# Patient Record
Sex: FEMALE | NOT HISPANIC OR LATINO | URBAN - METROPOLITAN AREA
[De-identification: names, ages, dates, MRNs, and addresses within clinical notes are randomized per-mention and may not be internally consistent; named-entity substitution may affect disease eponyms.]

---

## 2017-02-03 ENCOUNTER — OUTPATIENT (OUTPATIENT)
Dept: OUTPATIENT SERVICES | Facility: HOSPITAL | Age: 57
LOS: 1 days | End: 2017-02-03
Payer: COMMERCIAL

## 2017-02-03 ENCOUNTER — APPOINTMENT (OUTPATIENT)
Dept: ULTRASOUND IMAGING | Facility: IMAGING CENTER | Age: 57
End: 2017-02-03

## 2017-02-03 DIAGNOSIS — Z00.8 ENCOUNTER FOR OTHER GENERAL EXAMINATION: ICD-10-CM

## 2017-02-03 PROCEDURE — 76856 US EXAM PELVIC COMPLETE: CPT

## 2017-02-03 PROCEDURE — 76830 TRANSVAGINAL US NON-OB: CPT

## 2017-02-08 ENCOUNTER — EMERGENCY (EMERGENCY)
Facility: HOSPITAL | Age: 57
LOS: 1 days | Discharge: HOME | End: 2017-02-08
Attending: EMERGENCY MEDICINE | Admitting: EMERGENCY MEDICINE
Payer: COMMERCIAL

## 2017-02-08 VITALS
HEART RATE: 66 BPM | RESPIRATION RATE: 17 BRPM | OXYGEN SATURATION: 97 % | SYSTOLIC BLOOD PRESSURE: 141 MMHG | DIASTOLIC BLOOD PRESSURE: 93 MMHG

## 2017-02-08 VITALS
OXYGEN SATURATION: 96 % | RESPIRATION RATE: 16 BRPM | DIASTOLIC BLOOD PRESSURE: 117 MMHG | TEMPERATURE: 98 F | SYSTOLIC BLOOD PRESSURE: 183 MMHG | HEART RATE: 75 BPM

## 2017-02-08 DIAGNOSIS — T41.3X5A ADVERSE EFFECT OF LOCAL ANESTHETICS, INITIAL ENCOUNTER: ICD-10-CM

## 2017-02-08 LAB
ALBUMIN SERPL ELPH-MCNC: 4 G/DL — SIGNIFICANT CHANGE UP (ref 3.3–5)
ALP SERPL-CCNC: 91 U/L — SIGNIFICANT CHANGE UP (ref 40–120)
ALT FLD-CCNC: 19 U/L RC — SIGNIFICANT CHANGE UP (ref 10–45)
ANION GAP SERPL CALC-SCNC: 13 MMOL/L — SIGNIFICANT CHANGE UP (ref 5–17)
AST SERPL-CCNC: 22 U/L — SIGNIFICANT CHANGE UP (ref 10–40)
BASOPHILS # BLD AUTO: 0.1 K/UL — SIGNIFICANT CHANGE UP (ref 0–0.2)
BASOPHILS NFR BLD AUTO: 0.7 % — SIGNIFICANT CHANGE UP (ref 0–2)
BILIRUB SERPL-MCNC: 0.2 MG/DL — SIGNIFICANT CHANGE UP (ref 0.2–1.2)
BUN SERPL-MCNC: 15 MG/DL — SIGNIFICANT CHANGE UP (ref 7–23)
CALCIUM SERPL-MCNC: 9.2 MG/DL — SIGNIFICANT CHANGE UP (ref 8.4–10.5)
CHLORIDE SERPL-SCNC: 104 MMOL/L — SIGNIFICANT CHANGE UP (ref 96–108)
CK MB CFR SERPL CALC: 1.4 NG/ML — SIGNIFICANT CHANGE UP (ref 0–3.8)
CK SERPL-CCNC: 85 U/L — SIGNIFICANT CHANGE UP (ref 25–170)
CO2 SERPL-SCNC: 26 MMOL/L — SIGNIFICANT CHANGE UP (ref 22–31)
CREAT SERPL-MCNC: 0.65 MG/DL — SIGNIFICANT CHANGE UP (ref 0.5–1.3)
EOSINOPHIL # BLD AUTO: 0.4 K/UL — SIGNIFICANT CHANGE UP (ref 0–0.5)
EOSINOPHIL NFR BLD AUTO: 4.8 % — SIGNIFICANT CHANGE UP (ref 0–6)
GLUCOSE SERPL-MCNC: 103 MG/DL — HIGH (ref 70–99)
HCT VFR BLD CALC: 37 % — SIGNIFICANT CHANGE UP (ref 34.5–45)
HGB BLD-MCNC: 12.6 G/DL — SIGNIFICANT CHANGE UP (ref 11.5–15.5)
LYMPHOCYTES # BLD AUTO: 2.8 K/UL — SIGNIFICANT CHANGE UP (ref 1–3.3)
LYMPHOCYTES # BLD AUTO: 36.2 % — SIGNIFICANT CHANGE UP (ref 13–44)
MCHC RBC-ENTMCNC: 31.3 PG — SIGNIFICANT CHANGE UP (ref 27–34)
MCHC RBC-ENTMCNC: 34 GM/DL — SIGNIFICANT CHANGE UP (ref 32–36)
MCV RBC AUTO: 92.2 FL — SIGNIFICANT CHANGE UP (ref 80–100)
MONOCYTES # BLD AUTO: 0.6 K/UL — SIGNIFICANT CHANGE UP (ref 0–0.9)
MONOCYTES NFR BLD AUTO: 8.3 % — SIGNIFICANT CHANGE UP (ref 2–14)
NEUTROPHILS # BLD AUTO: 3.9 K/UL — SIGNIFICANT CHANGE UP (ref 1.8–7.4)
NEUTROPHILS NFR BLD AUTO: 50 % — SIGNIFICANT CHANGE UP (ref 43–77)
PLATELET # BLD AUTO: 150 K/UL — SIGNIFICANT CHANGE UP (ref 150–400)
POTASSIUM SERPL-MCNC: 4.2 MMOL/L — SIGNIFICANT CHANGE UP (ref 3.5–5.3)
POTASSIUM SERPL-SCNC: 4.2 MMOL/L — SIGNIFICANT CHANGE UP (ref 3.5–5.3)
PROT SERPL-MCNC: 7.4 G/DL — SIGNIFICANT CHANGE UP (ref 6–8.3)
RBC # BLD: 4.01 M/UL — SIGNIFICANT CHANGE UP (ref 3.8–5.2)
RBC # FLD: 11.2 % — SIGNIFICANT CHANGE UP (ref 10.3–14.5)
SODIUM SERPL-SCNC: 143 MMOL/L — SIGNIFICANT CHANGE UP (ref 135–145)
TROPONIN T SERPL-MCNC: <0.01 NG/ML — SIGNIFICANT CHANGE UP (ref 0–0.06)
WBC # BLD: 7.7 K/UL — SIGNIFICANT CHANGE UP (ref 3.8–10.5)
WBC # FLD AUTO: 7.7 K/UL — SIGNIFICANT CHANGE UP (ref 3.8–10.5)

## 2017-02-08 PROCEDURE — 84484 ASSAY OF TROPONIN QUANT: CPT

## 2017-02-08 PROCEDURE — 93010 ELECTROCARDIOGRAM REPORT: CPT | Mod: NC

## 2017-02-08 PROCEDURE — 71046 X-RAY EXAM CHEST 2 VIEWS: CPT

## 2017-02-08 PROCEDURE — 82553 CREATINE MB FRACTION: CPT

## 2017-02-08 PROCEDURE — 99284 EMERGENCY DEPT VISIT MOD MDM: CPT | Mod: 25

## 2017-02-08 PROCEDURE — 80053 COMPREHEN METABOLIC PANEL: CPT

## 2017-02-08 PROCEDURE — 85027 COMPLETE CBC AUTOMATED: CPT

## 2017-02-08 PROCEDURE — 71020: CPT | Mod: 26

## 2017-02-08 PROCEDURE — 99285 EMERGENCY DEPT VISIT HI MDM: CPT | Mod: 25

## 2017-02-08 PROCEDURE — 93005 ELECTROCARDIOGRAM TRACING: CPT

## 2017-02-08 PROCEDURE — 82550 ASSAY OF CK (CPK): CPT

## 2017-02-08 NOTE — ED PROVIDER NOTE - MEDICAL DECISION MAKING DETAILS
Patient with palpitations and non-focal weakness after receiving anesthetic injection during root canal.  Likely contained epinephrine which caused patient's symptoms.  Cardiac risk factors are HTN and overweight. Given age will check EKG and single trop (first episode 6 hours ago) monitor while in ER.  Will need outpatient follow up with cardiologist for provocative testing.

## 2017-02-08 NOTE — ED PROVIDER NOTE - OBJECTIVE STATEMENT
56F PMH p/w dizziness.  4pm sudden onset, felt weak, sweaty, anxious, and had palpitations. Has had palpitations in the past, had stress test 10 years ago was normal. Was at the dentist having root canal and occurred right after she got novacaine injection.  Told them about symptoms and they continued root canal, BP was 180/110.  Episode lasted 40 minutes, completely resolved.  Did not take anything for symptoms.  Continued to have episodes of palpitations and weakness for the next few hours, decided to come in.  No CP, had some cough with the palpitations, no SOB, no nausea/vomiting, no angioedema, no rash.  Did not take cozaar today.

## 2017-02-08 NOTE — ED PROVIDER NOTE - CARE PLAN
Principal Discharge DX:	Dizziness  Instructions for follow-up, activity and diet:	Follow up with your primary doctor in 2-3 days  Follow up with your Cardiologist in 1 week to consult about possible stress test  Take all usual medications as prescribed  Return to the emergency department for chest pain, worsening dizziness, inability to walk, nausea/vomiting, or if you have any new or changing symptoms or concerns Principal Discharge DX:	Medication reaction  Instructions for follow-up, activity and diet:	Follow up with your primary doctor in 2-3 days  Follow up with your Cardiologist in 1 week to consult about possible stress test  Take all usual medications as prescribed  Return to the emergency department for chest pain, worsening dizziness, inability to walk, nausea/vomiting, or if you have any new or changing symptoms or concerns

## 2017-02-08 NOTE — ED PROVIDER NOTE - NEUROLOGICAL, MLM
Alert and oriented, speech normal, CN II-XII intact, no focal deficits, no motor or sensory deficits, no pronator drift, finger to nose testing normal, no dysdiadochokinesia, gait normal.

## 2017-02-08 NOTE — ED ADULT NURSE NOTE - OBJECTIVE STATEMENT
Presents c/o dizziness today while getting a root canal on lt lower. Pt states that when the procedure was completed the dentist took her blood pressure and it was elevated. Pt went home and family noted a lt facial droop. All symptoms resolved on arrival to ED. Pt A&ox3. PERRL. ROSE. Ambulates. Denies CP/SOB at present. No numbness/tingling. Respirations even/unlabored. Abd soft. No n/v/d. Skin WDI.

## 2017-02-08 NOTE — ED PROVIDER NOTE - PLAN OF CARE
Follow up with your primary doctor in 2-3 days  Follow up with your Cardiologist in 1 week to consult about possible stress test  Take all usual medications as prescribed  Return to the emergency department for chest pain, worsening dizziness, inability to walk, nausea/vomiting, or if you have any new or changing symptoms or concerns

## 2017-02-08 NOTE — ED ADULT TRIAGE NOTE - CHIEF COMPLAINT QUOTE
during root canal today patient felt light headed and dizzy   at home patient's family noted left sided facial droop at 1800 which has resolved  no facial droop now, no drift or increased weakness to one side noted in triage.

## 2017-07-14 ENCOUNTER — EMERGENCY (EMERGENCY)
Facility: HOSPITAL | Age: 57
LOS: 1 days | Discharge: HOME | End: 2017-07-14
Attending: EMERGENCY MEDICINE
Payer: COMMERCIAL

## 2017-07-14 VITALS
SYSTOLIC BLOOD PRESSURE: 121 MMHG | TEMPERATURE: 98 F | HEART RATE: 88 BPM | RESPIRATION RATE: 18 BRPM | DIASTOLIC BLOOD PRESSURE: 84 MMHG | OXYGEN SATURATION: 98 %

## 2017-07-14 PROCEDURE — 99285 EMERGENCY DEPT VISIT HI MDM: CPT | Mod: 25

## 2017-07-14 NOTE — ED ADULT NURSE NOTE - OBJECTIVE STATEMENT
56 y.o. female presents to ED c/o rectal pain. Patient states she has history of colitis and has seen PMD last week secondary to diarrhea, rx home with steroids and cream, no antibiotics. Patient states she has had bloody diarrhea x 1 month, now worsening and increasing in frequency with some LLQ abd pain. scheduled for colonoscopy. Denies HA, CP, SOB, nausea, vomiting, recent travel, hematuria, dysuria. Patient undressed and placed into gown, call bell in hand and side rails up with bed in lowest position for safety. blanket provided, vital signs stable, patient in no acute distress. Comfort and safety provided.

## 2017-07-15 VITALS
TEMPERATURE: 98 F | RESPIRATION RATE: 18 BRPM | HEART RATE: 82 BPM | SYSTOLIC BLOOD PRESSURE: 121 MMHG | DIASTOLIC BLOOD PRESSURE: 75 MMHG | OXYGEN SATURATION: 98 %

## 2017-07-15 LAB
ALBUMIN SERPL ELPH-MCNC: 3.3 G/DL — SIGNIFICANT CHANGE UP (ref 3.3–5)
ALP SERPL-CCNC: 59 U/L — SIGNIFICANT CHANGE UP (ref 40–120)
ALT FLD-CCNC: 15 U/L RC — SIGNIFICANT CHANGE UP (ref 10–45)
ANION GAP SERPL CALC-SCNC: 13 MMOL/L — SIGNIFICANT CHANGE UP (ref 5–17)
APTT BLD: 33.8 SEC — SIGNIFICANT CHANGE UP (ref 27.5–37.4)
AST SERPL-CCNC: 22 U/L — SIGNIFICANT CHANGE UP (ref 10–40)
BASOPHILS # BLD AUTO: 0 K/UL — SIGNIFICANT CHANGE UP (ref 0–0.2)
BASOPHILS NFR BLD AUTO: 0.1 % — SIGNIFICANT CHANGE UP (ref 0–2)
BILIRUB SERPL-MCNC: 0.6 MG/DL — SIGNIFICANT CHANGE UP (ref 0.2–1.2)
BUN SERPL-MCNC: 9 MG/DL — SIGNIFICANT CHANGE UP (ref 7–23)
CALCIUM SERPL-MCNC: 8.8 MG/DL — SIGNIFICANT CHANGE UP (ref 8.4–10.5)
CHLORIDE SERPL-SCNC: 100 MMOL/L — SIGNIFICANT CHANGE UP (ref 96–108)
CO2 SERPL-SCNC: 26 MMOL/L — SIGNIFICANT CHANGE UP (ref 22–31)
CREAT SERPL-MCNC: 0.69 MG/DL — SIGNIFICANT CHANGE UP (ref 0.5–1.3)
EOSINOPHIL # BLD AUTO: 0 K/UL — SIGNIFICANT CHANGE UP (ref 0–0.5)
EOSINOPHIL NFR BLD AUTO: 0.3 % — SIGNIFICANT CHANGE UP (ref 0–6)
GAS PNL BLDV: SIGNIFICANT CHANGE UP
GLUCOSE SERPL-MCNC: 140 MG/DL — HIGH (ref 70–99)
HCT VFR BLD CALC: 39.6 % — SIGNIFICANT CHANGE UP (ref 34.5–45)
HGB BLD-MCNC: 12.8 G/DL — SIGNIFICANT CHANGE UP (ref 11.5–15.5)
INR BLD: 1.27 RATIO — HIGH (ref 0.88–1.16)
LYMPHOCYTES # BLD AUTO: 1.4 K/UL — SIGNIFICANT CHANGE UP (ref 1–3.3)
LYMPHOCYTES # BLD AUTO: 16.3 % — SIGNIFICANT CHANGE UP (ref 13–44)
MCHC RBC-ENTMCNC: 29.7 PG — SIGNIFICANT CHANGE UP (ref 27–34)
MCHC RBC-ENTMCNC: 32.3 GM/DL — SIGNIFICANT CHANGE UP (ref 32–36)
MCV RBC AUTO: 92 FL — SIGNIFICANT CHANGE UP (ref 80–100)
MONOCYTES # BLD AUTO: 1.2 K/UL — HIGH (ref 0–0.9)
MONOCYTES NFR BLD AUTO: 14.5 % — HIGH (ref 2–14)
NEUTROPHILS # BLD AUTO: 5.7 K/UL — SIGNIFICANT CHANGE UP (ref 1.8–7.4)
NEUTROPHILS NFR BLD AUTO: 68.9 % — SIGNIFICANT CHANGE UP (ref 43–77)
PLATELET # BLD AUTO: 194 K/UL — SIGNIFICANT CHANGE UP (ref 150–400)
POTASSIUM SERPL-MCNC: 4 MMOL/L — SIGNIFICANT CHANGE UP (ref 3.5–5.3)
POTASSIUM SERPL-SCNC: 4 MMOL/L — SIGNIFICANT CHANGE UP (ref 3.5–5.3)
PROT SERPL-MCNC: 6.9 G/DL — SIGNIFICANT CHANGE UP (ref 6–8.3)
PROTHROM AB SERPL-ACNC: 13.9 SEC — HIGH (ref 9.8–12.7)
RBC # BLD: 4.31 M/UL — SIGNIFICANT CHANGE UP (ref 3.8–5.2)
RBC # FLD: 11.6 % — SIGNIFICANT CHANGE UP (ref 10.3–14.5)
SODIUM SERPL-SCNC: 139 MMOL/L — SIGNIFICANT CHANGE UP (ref 135–145)
WBC # BLD: 8.3 K/UL — SIGNIFICANT CHANGE UP (ref 3.8–10.5)
WBC # FLD AUTO: 8.3 K/UL — SIGNIFICANT CHANGE UP (ref 3.8–10.5)

## 2017-07-15 PROCEDURE — 85027 COMPLETE CBC AUTOMATED: CPT

## 2017-07-15 PROCEDURE — 96374 THER/PROPH/DIAG INJ IV PUSH: CPT

## 2017-07-15 PROCEDURE — 85730 THROMBOPLASTIN TIME PARTIAL: CPT

## 2017-07-15 PROCEDURE — 82435 ASSAY OF BLOOD CHLORIDE: CPT

## 2017-07-15 PROCEDURE — 87449 NOS EACH ORGANISM AG IA: CPT

## 2017-07-15 PROCEDURE — 82330 ASSAY OF CALCIUM: CPT

## 2017-07-15 PROCEDURE — 74177 CT ABD & PELVIS W/CONTRAST: CPT | Mod: 26

## 2017-07-15 PROCEDURE — 84132 ASSAY OF SERUM POTASSIUM: CPT

## 2017-07-15 PROCEDURE — 87046 STOOL CULTR AEROBIC BACT EA: CPT

## 2017-07-15 PROCEDURE — 82803 BLOOD GASES ANY COMBINATION: CPT

## 2017-07-15 PROCEDURE — 87045 FECES CULTURE AEROBIC BACT: CPT

## 2017-07-15 PROCEDURE — 99284 EMERGENCY DEPT VISIT MOD MDM: CPT | Mod: 25

## 2017-07-15 PROCEDURE — 87324 CLOSTRIDIUM AG IA: CPT

## 2017-07-15 PROCEDURE — 85014 HEMATOCRIT: CPT

## 2017-07-15 PROCEDURE — 84295 ASSAY OF SERUM SODIUM: CPT

## 2017-07-15 PROCEDURE — 96375 TX/PRO/DX INJ NEW DRUG ADDON: CPT

## 2017-07-15 PROCEDURE — 83690 ASSAY OF LIPASE: CPT

## 2017-07-15 PROCEDURE — 87177 OVA AND PARASITES SMEARS: CPT

## 2017-07-15 PROCEDURE — 80053 COMPREHEN METABOLIC PANEL: CPT

## 2017-07-15 PROCEDURE — 82947 ASSAY GLUCOSE BLOOD QUANT: CPT

## 2017-07-15 PROCEDURE — 83605 ASSAY OF LACTIC ACID: CPT

## 2017-07-15 PROCEDURE — 74177 CT ABD & PELVIS W/CONTRAST: CPT

## 2017-07-15 PROCEDURE — 85610 PROTHROMBIN TIME: CPT

## 2017-07-15 RX ORDER — METRONIDAZOLE 500 MG
500 TABLET ORAL ONCE
Qty: 0 | Refills: 0 | Status: COMPLETED | OUTPATIENT
Start: 2017-07-15 | End: 2017-07-15

## 2017-07-15 RX ORDER — OXYCODONE HYDROCHLORIDE 5 MG/1
1 TABLET ORAL
Qty: 10 | Refills: 0 | OUTPATIENT
Start: 2017-07-15 | End: 2017-07-18

## 2017-07-15 RX ORDER — MORPHINE SULFATE 50 MG/1
2 CAPSULE, EXTENDED RELEASE ORAL ONCE
Qty: 0 | Refills: 0 | Status: DISCONTINUED | OUTPATIENT
Start: 2017-07-15 | End: 2017-07-15

## 2017-07-15 RX ORDER — METRONIDAZOLE 500 MG
1 TABLET ORAL
Qty: 30 | Refills: 0 | OUTPATIENT
Start: 2017-07-15 | End: 2017-07-25

## 2017-07-15 RX ORDER — CIPROFLOXACIN LACTATE 400MG/40ML
400 VIAL (ML) INTRAVENOUS ONCE
Qty: 0 | Refills: 0 | Status: COMPLETED | OUTPATIENT
Start: 2017-07-15 | End: 2017-07-15

## 2017-07-15 RX ORDER — OXYCODONE HYDROCHLORIDE 5 MG/1
5 TABLET ORAL ONCE
Qty: 0 | Refills: 0 | Status: DISCONTINUED | OUTPATIENT
Start: 2017-07-15 | End: 2017-07-15

## 2017-07-15 RX ORDER — CIPROFLOXACIN LACTATE 400MG/40ML
1 VIAL (ML) INTRAVENOUS
Qty: 20 | Refills: 0 | OUTPATIENT
Start: 2017-07-15 | End: 2017-07-25

## 2017-07-15 RX ADMIN — Medication 200 MILLIGRAM(S): at 05:55

## 2017-07-15 RX ADMIN — OXYCODONE HYDROCHLORIDE 5 MILLIGRAM(S): 5 TABLET ORAL at 07:52

## 2017-07-15 RX ADMIN — OXYCODONE HYDROCHLORIDE 5 MILLIGRAM(S): 5 TABLET ORAL at 06:54

## 2017-07-15 RX ADMIN — Medication 100 MILLIGRAM(S): at 07:51

## 2017-07-15 RX ADMIN — Medication 40 MILLIGRAM(S): at 06:54

## 2017-07-15 RX ADMIN — MORPHINE SULFATE 2 MILLIGRAM(S): 50 CAPSULE, EXTENDED RELEASE ORAL at 02:46

## 2017-07-15 NOTE — ED PROVIDER NOTE - CARE PLAN
Principal Discharge DX:	Abdominal pain  Instructions for follow-up, activity and diet:	You were seen in the Emergency Department for abdominal pain. Take the steroids and antibiotics as written. Please follow up with your gastroenterologist as soon as possible for further evaluation. Please return to the Emergency Department if you have any new concerning symptoms such as severe pain, weakness, shortness of breath or any other concerning symptoms.

## 2017-07-15 NOTE — ED ADULT NURSE REASSESSMENT NOTE - NS ED NURSE REASSESS COMMENT FT1
Received awake alert and orientedx3 Resp even and nonlab Denies sob denies chest pain Flagyl given as ordered stool specimen to lab

## 2017-07-15 NOTE — ED PROVIDER NOTE - OBJECTIVE STATEMENT
57yo female history of ulcerative colitis p/w diarrhea with blood for 1 month. Pt. reporting increasing frequency of stools. Pt. saw GI monday, started on topical anesthetic. Not on antibiotics. Pt. scheduled for outpatient colonoscopy. Pt. using rectal hydrocortisone, mesalamine. Pt. reports bloody bowel movements worse with eating. Pt. reports lower abdominal pain, aggravated by eating, no alleviating factors. GI: Dr. Andre David ([facility]). Pt. had subjective fever yesterday. Denies chest pain, shortness of breath, nausea/vomiting, dysuria, hematuria, sick contacts, recent travel. Colonoscopy last January. PMD: Scott Arce([facility])

## 2017-07-15 NOTE — ED PROVIDER NOTE - PROGRESS NOTE DETAILS
Patient reports improvement in symptoms. Agrees with discharge and outpatient follow up with strict return precautions. Patient's GI Dr. Salguero would like patient discharged on cipro, flagyl and prednisone 40mg once a day and have her follow up this week for further evaluation.

## 2017-07-15 NOTE — ED PROVIDER NOTE - PLAN OF CARE
You were seen in the Emergency Department for abdominal pain. Take the steroids and antibiotics as written. Please follow up with your gastroenterologist as soon as possible for further evaluation. Please return to the Emergency Department if you have any new concerning symptoms such as severe pain, weakness, shortness of breath or any other concerning symptoms.

## 2017-07-15 NOTE — ED PROVIDER NOTE - MEDICAL DECISION MAKING DETAILS
55yo female history of ulcerative colitis p/w diarrhea with blood for 1 month. Lower abdominal pain. Most likely UC flare. WIll obtain labs, CT, speak with her GI, reassess. 57yo female history of ulcerative colitis p/w diarrhea with blood for 1 month. Lower abdominal pain. Most likely UC flare. WIll obtain labs, CT, speak with her GI, reassess.    Att55 y/o female with UC p/w frequent bloody BMs despite being on mesalamine. CT shows colitis. Treated with ABX in ED. Spoke with her GI physician who recommends starting steroids, treating with ABX, sending c. diff (if possible) and outpt f/u if pt feels comfortable. Pt does feel well enough to go  home. No N/V, tolerating PO. Return precautions given. Advised to f/u within 2 days with GI physician. --Robyn Lopez MD.

## 2017-07-18 LAB
CULTURE RESULTS: SIGNIFICANT CHANGE UP
SPECIMEN SOURCE: SIGNIFICANT CHANGE UP

## 2017-07-20 LAB
CULTURE RESULTS: SIGNIFICANT CHANGE UP
SPECIMEN SOURCE: SIGNIFICANT CHANGE UP

## 2017-08-18 RX ORDER — INFLIXIMAB-DYYB 120 MG/ML
0 INJECTION SUBCUTANEOUS
Qty: 0 | Refills: 0 | COMMUNITY
Start: 2017-08-18

## 2017-08-20 ENCOUNTER — INPATIENT (INPATIENT)
Facility: HOSPITAL | Age: 57
LOS: 1 days | Discharge: HOME | DRG: 387 | End: 2017-08-22
Attending: INTERNAL MEDICINE | Admitting: INTERNAL MEDICINE
Payer: COMMERCIAL

## 2017-08-20 VITALS
HEART RATE: 93 BPM | RESPIRATION RATE: 20 BRPM | OXYGEN SATURATION: 96 % | TEMPERATURE: 98 F | DIASTOLIC BLOOD PRESSURE: 99 MMHG | WEIGHT: 164.91 LBS | SYSTOLIC BLOOD PRESSURE: 164 MMHG

## 2017-08-20 DIAGNOSIS — K51.90 ULCERATIVE COLITIS, UNSPECIFIED, WITHOUT COMPLICATIONS: ICD-10-CM

## 2017-08-20 DIAGNOSIS — I10 ESSENTIAL (PRIMARY) HYPERTENSION: ICD-10-CM

## 2017-08-20 DIAGNOSIS — R51 HEADACHE: ICD-10-CM

## 2017-08-20 DIAGNOSIS — R11.2 NAUSEA WITH VOMITING, UNSPECIFIED: ICD-10-CM

## 2017-08-20 LAB
ALBUMIN SERPL ELPH-MCNC: 3.6 G/DL — SIGNIFICANT CHANGE UP (ref 3.3–5)
ALP SERPL-CCNC: 60 U/L — SIGNIFICANT CHANGE UP (ref 40–120)
ALT FLD-CCNC: 35 U/L RC — SIGNIFICANT CHANGE UP (ref 10–45)
ANION GAP SERPL CALC-SCNC: 12 MMOL/L — SIGNIFICANT CHANGE UP (ref 5–17)
ANION GAP SERPL CALC-SCNC: 13 MMOL/L — SIGNIFICANT CHANGE UP (ref 5–17)
AST SERPL-CCNC: 29 U/L — SIGNIFICANT CHANGE UP (ref 10–40)
BASE EXCESS BLDV CALC-SCNC: 3.2 MMOL/L — HIGH (ref -2–2)
BASOPHILS # BLD AUTO: 0.1 K/UL — SIGNIFICANT CHANGE UP (ref 0–0.2)
BASOPHILS NFR BLD AUTO: 0.6 % — SIGNIFICANT CHANGE UP (ref 0–2)
BILIRUB SERPL-MCNC: 0.3 MG/DL — SIGNIFICANT CHANGE UP (ref 0.2–1.2)
BUN SERPL-MCNC: 11 MG/DL — SIGNIFICANT CHANGE UP (ref 7–23)
BUN SERPL-MCNC: 14 MG/DL — SIGNIFICANT CHANGE UP (ref 7–23)
C DIFF GDH STL QL: NEGATIVE — SIGNIFICANT CHANGE UP
C DIFF GDH STL QL: SIGNIFICANT CHANGE UP
CA-I SERPL-SCNC: 1.2 MMOL/L — SIGNIFICANT CHANGE UP (ref 1.12–1.3)
CALCIUM SERPL-MCNC: 8.5 MG/DL — SIGNIFICANT CHANGE UP (ref 8.4–10.5)
CALCIUM SERPL-MCNC: 9.2 MG/DL — SIGNIFICANT CHANGE UP (ref 8.4–10.5)
CHLORIDE BLDV-SCNC: 103 MMOL/L — SIGNIFICANT CHANGE UP (ref 96–108)
CHLORIDE SERPL-SCNC: 100 MMOL/L — SIGNIFICANT CHANGE UP (ref 96–108)
CHLORIDE SERPL-SCNC: 101 MMOL/L — SIGNIFICANT CHANGE UP (ref 96–108)
CO2 BLDV-SCNC: 30 MMOL/L — SIGNIFICANT CHANGE UP (ref 22–30)
CO2 SERPL-SCNC: 26 MMOL/L — SIGNIFICANT CHANGE UP (ref 22–31)
CO2 SERPL-SCNC: 27 MMOL/L — SIGNIFICANT CHANGE UP (ref 22–31)
CREAT SERPL-MCNC: 0.54 MG/DL — SIGNIFICANT CHANGE UP (ref 0.5–1.3)
CREAT SERPL-MCNC: 0.61 MG/DL — SIGNIFICANT CHANGE UP (ref 0.5–1.3)
EOSINOPHIL # BLD AUTO: 0.1 K/UL — SIGNIFICANT CHANGE UP (ref 0–0.5)
EOSINOPHIL NFR BLD AUTO: 1 % — SIGNIFICANT CHANGE UP (ref 0–6)
GAS PNL BLDV: 138 MMOL/L — SIGNIFICANT CHANGE UP (ref 136–145)
GAS PNL BLDV: SIGNIFICANT CHANGE UP
GIANT PLATELETS BLD QL SMEAR: PRESENT — SIGNIFICANT CHANGE UP
GLUCOSE BLDV-MCNC: 137 MG/DL — HIGH (ref 70–99)
GLUCOSE SERPL-MCNC: 118 MG/DL — HIGH (ref 70–99)
GLUCOSE SERPL-MCNC: 149 MG/DL — HIGH (ref 70–99)
HCO3 BLDV-SCNC: 28 MMOL/L — SIGNIFICANT CHANGE UP (ref 21–29)
HCT VFR BLD CALC: 40.2 % — SIGNIFICANT CHANGE UP (ref 34.5–45)
HCT VFR BLDA CALC: 41 % — SIGNIFICANT CHANGE UP (ref 39–50)
HGB BLD CALC-MCNC: 13.4 G/DL — SIGNIFICANT CHANGE UP (ref 11.5–15.5)
HGB BLD-MCNC: 13.2 G/DL — SIGNIFICANT CHANGE UP (ref 11.5–15.5)
LACTATE BLDV-MCNC: 1.2 MMOL/L — SIGNIFICANT CHANGE UP (ref 0.7–2)
LIDOCAIN IGE QN: 62 U/L — HIGH (ref 7–60)
LYMPHOCYTES # BLD AUTO: 1.5 K/UL — SIGNIFICANT CHANGE UP (ref 1–3.3)
LYMPHOCYTES # BLD AUTO: 13.6 % — SIGNIFICANT CHANGE UP (ref 13–44)
MCHC RBC-ENTMCNC: 31.4 PG — SIGNIFICANT CHANGE UP (ref 27–34)
MCHC RBC-ENTMCNC: 32.9 GM/DL — SIGNIFICANT CHANGE UP (ref 32–36)
MCV RBC AUTO: 95.5 FL — SIGNIFICANT CHANGE UP (ref 80–100)
MONOCYTES # BLD AUTO: 0.5 K/UL — SIGNIFICANT CHANGE UP (ref 0–0.9)
MONOCYTES NFR BLD AUTO: 4.8 % — SIGNIFICANT CHANGE UP (ref 2–14)
NEUTROPHILS # BLD AUTO: 8.6 K/UL — HIGH (ref 1.8–7.4)
NEUTROPHILS NFR BLD AUTO: 80.1 % — HIGH (ref 43–77)
PCO2 BLDV: 48 MMHG — SIGNIFICANT CHANGE UP (ref 35–50)
PH BLDV: 7.39 — SIGNIFICANT CHANGE UP (ref 7.35–7.45)
PLAT MORPH BLD: ABNORMAL
PLATELET # BLD AUTO: 94 K/UL — LOW (ref 150–400)
PO2 BLDV: 32 MMHG — SIGNIFICANT CHANGE UP (ref 25–45)
POTASSIUM BLDV-SCNC: 3.3 MMOL/L — LOW (ref 3.5–5)
POTASSIUM SERPL-MCNC: 3.3 MMOL/L — LOW (ref 3.5–5.3)
POTASSIUM SERPL-MCNC: 3.6 MMOL/L — SIGNIFICANT CHANGE UP (ref 3.5–5.3)
POTASSIUM SERPL-SCNC: 3.3 MMOL/L — LOW (ref 3.5–5.3)
POTASSIUM SERPL-SCNC: 3.6 MMOL/L — SIGNIFICANT CHANGE UP (ref 3.5–5.3)
PROT SERPL-MCNC: 6.8 G/DL — SIGNIFICANT CHANGE UP (ref 6–8.3)
RBC # BLD: 4.21 M/UL — SIGNIFICANT CHANGE UP (ref 3.8–5.2)
RBC # FLD: 13.2 % — SIGNIFICANT CHANGE UP (ref 10.3–14.5)
RBC BLD AUTO: NORMAL — SIGNIFICANT CHANGE UP
SAO2 % BLDV: 57 % — LOW (ref 67–88)
SODIUM SERPL-SCNC: 139 MMOL/L — SIGNIFICANT CHANGE UP (ref 135–145)
SODIUM SERPL-SCNC: 140 MMOL/L — SIGNIFICANT CHANGE UP (ref 135–145)
WBC # BLD: 10.8 K/UL — HIGH (ref 3.8–10.5)
WBC # FLD AUTO: 10.8 K/UL — HIGH (ref 3.8–10.5)

## 2017-08-20 PROCEDURE — 93010 ELECTROCARDIOGRAM REPORT: CPT | Mod: NC

## 2017-08-20 PROCEDURE — 74177 CT ABD & PELVIS W/CONTRAST: CPT | Mod: 26

## 2017-08-20 PROCEDURE — 71010: CPT | Mod: 26

## 2017-08-20 PROCEDURE — 99285 EMERGENCY DEPT VISIT HI MDM: CPT | Mod: 25

## 2017-08-20 RX ORDER — MORPHINE SULFATE 50 MG/1
2 CAPSULE, EXTENDED RELEASE ORAL EVERY 4 HOURS
Qty: 0 | Refills: 0 | Status: DISCONTINUED | OUTPATIENT
Start: 2017-08-20 | End: 2017-08-22

## 2017-08-20 RX ORDER — HYDROCORTISONE 20 MG
100 TABLET ORAL AT BEDTIME
Qty: 0 | Refills: 0 | Status: DISCONTINUED | OUTPATIENT
Start: 2017-08-20 | End: 2017-08-22

## 2017-08-20 RX ORDER — MESALAMINE 400 MG
4 TABLET, DELAYED RELEASE (ENTERIC COATED) ORAL
Qty: 0 | Refills: 0 | COMMUNITY

## 2017-08-20 RX ORDER — CIPROFLOXACIN LACTATE 400MG/40ML
400 VIAL (ML) INTRAVENOUS ONCE
Qty: 0 | Refills: 0 | Status: COMPLETED | OUTPATIENT
Start: 2017-08-20 | End: 2017-08-20

## 2017-08-20 RX ORDER — LOSARTAN POTASSIUM 100 MG/1
50 TABLET, FILM COATED ORAL DAILY
Qty: 0 | Refills: 0 | Status: DISCONTINUED | OUTPATIENT
Start: 2017-08-20 | End: 2017-08-22

## 2017-08-20 RX ORDER — ACETAMINOPHEN 500 MG
650 TABLET ORAL EVERY 6 HOURS
Qty: 0 | Refills: 0 | Status: DISCONTINUED | OUTPATIENT
Start: 2017-08-20 | End: 2017-08-22

## 2017-08-20 RX ORDER — ONDANSETRON 8 MG/1
4 TABLET, FILM COATED ORAL EVERY 8 HOURS
Qty: 0 | Refills: 0 | Status: DISCONTINUED | OUTPATIENT
Start: 2017-08-20 | End: 2017-08-20

## 2017-08-20 RX ORDER — MESALAMINE 400 MG
0 TABLET, DELAYED RELEASE (ENTERIC COATED) ORAL
Qty: 0 | Refills: 0 | COMMUNITY

## 2017-08-20 RX ORDER — MESALAMINE 400 MG
1000 TABLET, DELAYED RELEASE (ENTERIC COATED) ORAL AT BEDTIME
Qty: 0 | Refills: 0 | Status: DISCONTINUED | OUTPATIENT
Start: 2017-08-20 | End: 2017-08-22

## 2017-08-20 RX ORDER — METRONIDAZOLE 500 MG
500 TABLET ORAL ONCE
Qty: 0 | Refills: 0 | Status: COMPLETED | OUTPATIENT
Start: 2017-08-20 | End: 2017-08-20

## 2017-08-20 RX ORDER — CIPROFLOXACIN LACTATE 400MG/40ML
VIAL (ML) INTRAVENOUS
Qty: 0 | Refills: 0 | Status: DISCONTINUED | OUTPATIENT
Start: 2017-08-20 | End: 2017-08-21

## 2017-08-20 RX ORDER — POTASSIUM CHLORIDE 20 MEQ
10 PACKET (EA) ORAL
Qty: 0 | Refills: 0 | Status: COMPLETED | OUTPATIENT
Start: 2017-08-20 | End: 2017-08-20

## 2017-08-20 RX ORDER — DEXTROSE MONOHYDRATE, SODIUM CHLORIDE, AND POTASSIUM CHLORIDE 50; .745; 4.5 G/1000ML; G/1000ML; G/1000ML
1000 INJECTION, SOLUTION INTRAVENOUS
Qty: 0 | Refills: 0 | Status: DISCONTINUED | OUTPATIENT
Start: 2017-08-20 | End: 2017-08-22

## 2017-08-20 RX ORDER — CIPROFLOXACIN LACTATE 400MG/40ML
400 VIAL (ML) INTRAVENOUS EVERY 12 HOURS
Qty: 0 | Refills: 0 | Status: DISCONTINUED | OUTPATIENT
Start: 2017-08-21 | End: 2017-08-21

## 2017-08-20 RX ORDER — PANTOPRAZOLE SODIUM 20 MG/1
40 TABLET, DELAYED RELEASE ORAL ONCE
Qty: 0 | Refills: 0 | Status: COMPLETED | OUTPATIENT
Start: 2017-08-20 | End: 2017-08-20

## 2017-08-20 RX ORDER — ACETAMINOPHEN 500 MG
1000 TABLET ORAL ONCE
Qty: 0 | Refills: 0 | Status: COMPLETED | OUTPATIENT
Start: 2017-08-20 | End: 2017-08-20

## 2017-08-20 RX ORDER — MESALAMINE 400 MG
1 TABLET, DELAYED RELEASE (ENTERIC COATED) ORAL
Qty: 0 | Refills: 0 | COMMUNITY

## 2017-08-20 RX ORDER — HYDROCORTISONE 20 MG
100 TABLET ORAL EVERY 8 HOURS
Qty: 0 | Refills: 0 | Status: DISCONTINUED | OUTPATIENT
Start: 2017-08-20 | End: 2017-08-22

## 2017-08-20 RX ORDER — HYDROCORTISONE/PRAMOXINE 2.5 %-1 %
1 CREAM WITH APPLICATOR RECTAL
Qty: 0 | Refills: 0 | COMMUNITY

## 2017-08-20 RX ORDER — METRONIDAZOLE 500 MG
500 TABLET ORAL ONCE
Qty: 0 | Refills: 0 | Status: DISCONTINUED | OUTPATIENT
Start: 2017-08-20 | End: 2017-08-20

## 2017-08-20 RX ORDER — HYDROCORTISONE 20 MG
0 TABLET ORAL
Qty: 0 | Refills: 0 | COMMUNITY

## 2017-08-20 RX ORDER — PANTOPRAZOLE SODIUM 20 MG/1
40 TABLET, DELAYED RELEASE ORAL DAILY
Qty: 0 | Refills: 0 | Status: DISCONTINUED | OUTPATIENT
Start: 2017-08-21 | End: 2017-08-22

## 2017-08-20 RX ORDER — METRONIDAZOLE 500 MG
TABLET ORAL
Qty: 0 | Refills: 0 | Status: DISCONTINUED | OUTPATIENT
Start: 2017-08-20 | End: 2017-08-21

## 2017-08-20 RX ORDER — ONDANSETRON 8 MG/1
4 TABLET, FILM COATED ORAL ONCE
Qty: 0 | Refills: 0 | Status: COMPLETED | OUTPATIENT
Start: 2017-08-20 | End: 2017-08-20

## 2017-08-20 RX ORDER — OXYCODONE HYDROCHLORIDE 5 MG/1
10 TABLET ORAL EVERY 4 HOURS
Qty: 0 | Refills: 0 | Status: DISCONTINUED | OUTPATIENT
Start: 2017-08-20 | End: 2017-08-22

## 2017-08-20 RX ORDER — LOSARTAN POTASSIUM 100 MG/1
0 TABLET, FILM COATED ORAL
Qty: 0 | Refills: 0 | COMMUNITY

## 2017-08-20 RX ORDER — INFLIXIMAB-DYYB 120 MG/ML
0 INJECTION SUBCUTANEOUS
Qty: 0 | Refills: 0 | COMMUNITY

## 2017-08-20 RX ORDER — METRONIDAZOLE 500 MG
500 TABLET ORAL EVERY 8 HOURS
Qty: 0 | Refills: 0 | Status: DISCONTINUED | OUTPATIENT
Start: 2017-08-21 | End: 2017-08-21

## 2017-08-20 RX ORDER — ONDANSETRON 8 MG/1
4 TABLET, FILM COATED ORAL EVERY 8 HOURS
Qty: 0 | Refills: 0 | Status: DISCONTINUED | OUTPATIENT
Start: 2017-08-20 | End: 2017-08-22

## 2017-08-20 RX ORDER — SODIUM CHLORIDE 9 MG/ML
1000 INJECTION INTRAMUSCULAR; INTRAVENOUS; SUBCUTANEOUS ONCE
Qty: 0 | Refills: 0 | Status: COMPLETED | OUTPATIENT
Start: 2017-08-20 | End: 2017-08-20

## 2017-08-20 RX ORDER — CIPROFLOXACIN LACTATE 400MG/40ML
400 VIAL (ML) INTRAVENOUS ONCE
Qty: 0 | Refills: 0 | Status: DISCONTINUED | OUTPATIENT
Start: 2017-08-20 | End: 2017-08-20

## 2017-08-20 RX ORDER — MORPHINE SULFATE 50 MG/1
4 CAPSULE, EXTENDED RELEASE ORAL ONCE
Qty: 0 | Refills: 0 | Status: DISCONTINUED | OUTPATIENT
Start: 2017-08-20 | End: 2017-08-20

## 2017-08-20 RX ADMIN — OXYCODONE HYDROCHLORIDE 10 MILLIGRAM(S): 5 TABLET ORAL at 21:10

## 2017-08-20 RX ADMIN — Medication 100 MILLIEQUIVALENT(S): at 12:41

## 2017-08-20 RX ADMIN — PANTOPRAZOLE SODIUM 40 MILLIGRAM(S): 20 TABLET, DELAYED RELEASE ORAL at 15:59

## 2017-08-20 RX ADMIN — Medication 200 MILLIGRAM(S): at 20:05

## 2017-08-20 RX ADMIN — ONDANSETRON 4 MILLIGRAM(S): 8 TABLET, FILM COATED ORAL at 17:33

## 2017-08-20 RX ADMIN — ONDANSETRON 4 MILLIGRAM(S): 8 TABLET, FILM COATED ORAL at 10:34

## 2017-08-20 RX ADMIN — Medication 100 MILLIGRAM(S): at 23:48

## 2017-08-20 RX ADMIN — MORPHINE SULFATE 2 MILLIGRAM(S): 50 CAPSULE, EXTENDED RELEASE ORAL at 18:11

## 2017-08-20 RX ADMIN — Medication 100 MILLIEQUIVALENT(S): at 13:54

## 2017-08-20 RX ADMIN — MORPHINE SULFATE 4 MILLIGRAM(S): 50 CAPSULE, EXTENDED RELEASE ORAL at 10:45

## 2017-08-20 RX ADMIN — Medication 400 MILLIGRAM(S): at 10:53

## 2017-08-20 RX ADMIN — Medication 100 MILLIEQUIVALENT(S): at 14:51

## 2017-08-20 RX ADMIN — LOSARTAN POTASSIUM 50 MILLIGRAM(S): 100 TABLET, FILM COATED ORAL at 20:05

## 2017-08-20 RX ADMIN — OXYCODONE HYDROCHLORIDE 10 MILLIGRAM(S): 5 TABLET ORAL at 19:24

## 2017-08-20 RX ADMIN — MORPHINE SULFATE 4 MILLIGRAM(S): 50 CAPSULE, EXTENDED RELEASE ORAL at 10:34

## 2017-08-20 RX ADMIN — SODIUM CHLORIDE 4000 MILLILITER(S): 9 INJECTION INTRAMUSCULAR; INTRAVENOUS; SUBCUTANEOUS at 10:34

## 2017-08-20 RX ADMIN — MORPHINE SULFATE 2 MILLIGRAM(S): 50 CAPSULE, EXTENDED RELEASE ORAL at 17:41

## 2017-08-20 RX ADMIN — Medication 100 MILLIGRAM(S): at 21:15

## 2017-08-20 RX ADMIN — Medication 1000 MILLIGRAM(S): at 12:15

## 2017-08-20 NOTE — ED PROVIDER NOTE - OBJECTIVE STATEMENT
57yo F with UC on remicade/prednisone with severe generalized abd pain x 3 days with n/v/d, today with some PRBPR, +headache frontal, intermittent, no focal weakness, no vision changes, +pain to rectum due to constant diarrhea. no back pain. no fever/chills.     PCP- dR Carballo ([facility])  GI- Ilir Marinelli ([facility])

## 2017-08-20 NOTE — H&P ADULT - HISTORY OF PRESENT ILLNESS
55yo F with ulcerative colitis on remicade and prednisone with severe generalized abd pain x 3 days. Also reports nausea and vomiting and diarrhea with loose stool. Some bright red blood with stools, no mucus. Every time she moves her bowels she develops headache frontal, intermittent, no focal weakness, no vision changes. Positive tenesmus and pain in rectum due to constant diarrhea. No fever. 1 month prior she took 3 days of po Cipro for an infection.

## 2017-08-20 NOTE — H&P ADULT - NSHPREVIEWOFSYSTEMS_GEN_ALL_CORE
Gen: no loss of wt + some loss of appetite  ENT: no dizziness or hearing loss  Ophth: no blurring of vision or loss of vision  Resp: No cough or sputum production  CVS: No CP or palpitations or orthopnea  GI: + N/V/D + rectal pain  : no dysuria, hematuria  Endo: no polyuria or excessive sweating  Neuro: no weakness or paresthesias  Psych: No suicidal or depressive ideation  Heme: No petechiae or easy bruising  Msk: No joint pain or swelling  All other ROS negative

## 2017-08-20 NOTE — H&P ADULT - ATTENDING COMMENTS
discussed with patient in detail, all questions answered  discussed with family at bedside in detail

## 2017-08-20 NOTE — H&P ADULT - PROBLEM SELECTOR PLAN 2
likely flare up  will monitor  GI Dr Campbell to see  continue prednisone if possible, if not tolerating po change to hydrocortisone IV

## 2017-08-20 NOTE — H&P ADULT - FAMILY HISTORY
Father  Still living? Unknown  Family history of hypertension in father, Age at diagnosis: Age Unknown

## 2017-08-20 NOTE — ED PROVIDER NOTE - MEDICAL DECISION MAKING DETAILS
r/o colitis flare/complication, not peritoneal, will hydrate, pain control, labs with cultures. CT A/P will ilkely require admission for ppain and inability to tolerate PO r/o colitis flare/complication, not peritoneal, will hydrate, pain control, labs with cultures. CT A/P will ilkely require admission for ppain and inability to tolerate PO  Attending Jamey: 57 y/o female h/o ulcerative colitis presenting with lower abdominal pain. no reported black or bloody stools. no recent abx. concern for colitis flare. will hydrate, check labs and ct abd/pelv. pain control and re-eval

## 2017-08-20 NOTE — ED PROVIDER NOTE - PROGRESS NOTE DETAILS
nausea improved, still with profuse diarrhea. will admit for colitis, ikely UC flare, will send Cdiff with pan colitis, after discussion with Dr. Cox will hold off on Abx, IV protonix, hold off on prednisone until seen by SANDIE Boateng DO Attending Jamey: ct shows pan colitis. will send cdiff. d/w admitting team prefer hold off on abx until seen by gi.

## 2017-08-20 NOTE — ED PROVIDER NOTE - NS ED ROS FT
ROS: no CP/SOB. no cough. +n/v/d, +abd pain. no rash. no bleeding. no urinary complaints. no weakness. no vision changes. +HA. no neck/back pain. no extremity swelling.

## 2017-08-20 NOTE — ED ADULT NURSE NOTE - OBJECTIVE STATEMENT
received pt alert and orientedX4. No distress. Breathing easy and non labored. Pt anxious. Emotional support offered. Pt ambulatory. No nausea or vomiting.

## 2017-08-20 NOTE — ED PROVIDER NOTE - PHYSICAL EXAMINATION
Gen: uncomfortable, difficulty finding comfortable position on stretcher, AOx3  Head: NCAT  HEENT: PERRL, oral mucosa moist, normal conjunctiva, neck supple  Lung: CTAB, no respiratory distress  CV: rrr, no murmur, Normal perfusion  Abd: soft, diffuse mild ttp, no reboung/guarding, ND  MSK: No edema, no visible deformities  Neuro: No focal neurologic deficits  Skin: No rash   Psych: normal affect Gen: uncomfortable, difficulty finding comfortable position on stretcher, AOx3  Head: NCAT  HEENT: PERRL, oral mucosa moist, normal conjunctiva, neck supple  Lung: CTAB, no respiratory distress  CV: rrr, no murmur, Normal perfusion  Abd: soft, diffuse mild ttp, no reboung/guarding, ND  MSK: No edema, no visible deformities  Neuro: No focal neurologic deficits  Skin: No rash   Psych: normal affect  Attending Concepcion: Gen: NAD, heent: atrauamtic, eomi, perrla, mmm, op pink, uvula midline, neck; nttp, no nuchal rigidity, chest: nttp, no crepitus, cv: rrr, no murmurs, lungs: ctab, abd: soft, diffusely tender to palpation, no peritoneal signs, +BS, no guarding, ext: wwp, neg homans, skin: no rash, neuro: awake and alert, following commands, speech clear, sensation and strength intact, no focal deficits

## 2017-08-20 NOTE — H&P ADULT - NSHPPHYSICALEXAM_GEN_ALL_CORE
vital signs as above  in no apparent distress  HEENT: SHRUTI EOMI  Neck: Supple, no JVD, no thyromegaly  Lungs: clear, no rhonchi, no wheeze, no crackles  CVS: S1 S2 no M/R/G  Abdomen: no organomegaly, BS present, minimal diffuse tendeness RLQ > LLQ, no rebound  Neuro: AO x 3 no focal weakness, no sensory abnormalities  Psych: appropriate affect  Skin: warm, dry  Ext: no edema, no clubbing  Msk: no joint swelling or deformities  Back: no CVA tenderness, no kyphosis/scoliosis

## 2017-08-20 NOTE — ED ADULT NURSE REASSESSMENT NOTE - NS ED NURSE REASSESS COMMENT FT1
Pt's daughter asked me why the pt's gastroenterologist did not give pt antibiotics. I informed her that I do not know the answer and pt referred to Dr. Morin to answer questions. Pt's daughter anxious and observed to be angry. Emotional support offered to pt.

## 2017-08-20 NOTE — CHART NOTE - NSCHARTNOTEFT_GEN_A_CORE
Notified by RN that Pt refusing enema Cortema.  Pt stated that she was on it in past, and was not effective, so MD started her on oral prednisone.  Will f/u with primary team in am. Notified by RN that Pt refusing enema Cortenema.  Pt stated that she was on it in past, and was not effective, so MD started her on oral prednisone.  f/u with primary team in am.    Tana Motley, ANP [id number]

## 2017-08-21 DIAGNOSIS — D69.6 THROMBOCYTOPENIA, UNSPECIFIED: ICD-10-CM

## 2017-08-21 DIAGNOSIS — R19.7 DIARRHEA, UNSPECIFIED: ICD-10-CM

## 2017-08-21 DIAGNOSIS — K51.918 ULCERATIVE COLITIS, UNSPECIFIED WITH OTHER COMPLICATION: ICD-10-CM

## 2017-08-21 LAB
ANION GAP SERPL CALC-SCNC: 11 MMOL/L — SIGNIFICANT CHANGE UP (ref 5–17)
BUN SERPL-MCNC: 10 MG/DL — SIGNIFICANT CHANGE UP (ref 7–23)
CALCIUM SERPL-MCNC: 8.7 MG/DL — SIGNIFICANT CHANGE UP (ref 8.4–10.5)
CHLORIDE SERPL-SCNC: 103 MMOL/L — SIGNIFICANT CHANGE UP (ref 96–108)
CO2 SERPL-SCNC: 27 MMOL/L — SIGNIFICANT CHANGE UP (ref 22–31)
CREAT SERPL-MCNC: 0.57 MG/DL — SIGNIFICANT CHANGE UP (ref 0.5–1.3)
GLUCOSE SERPL-MCNC: 160 MG/DL — HIGH (ref 70–99)
HCT VFR BLD CALC: 38.4 % — SIGNIFICANT CHANGE UP (ref 34.5–45)
HCV AB S/CO SERPL IA: 0.21 S/CO — SIGNIFICANT CHANGE UP
HCV AB SERPL-IMP: SIGNIFICANT CHANGE UP
HGB BLD-MCNC: 12.5 G/DL — SIGNIFICANT CHANGE UP (ref 11.5–15.5)
LIDOCAIN IGE QN: 13 U/L — SIGNIFICANT CHANGE UP (ref 7–60)
MCHC RBC-ENTMCNC: 30.5 PG — SIGNIFICANT CHANGE UP (ref 27–34)
MCHC RBC-ENTMCNC: 32.5 GM/DL — SIGNIFICANT CHANGE UP (ref 32–36)
MCV RBC AUTO: 93.9 FL — SIGNIFICANT CHANGE UP (ref 80–100)
PLATELET # BLD AUTO: 92 K/UL — LOW (ref 150–400)
POTASSIUM SERPL-MCNC: 4 MMOL/L — SIGNIFICANT CHANGE UP (ref 3.5–5.3)
POTASSIUM SERPL-SCNC: 4 MMOL/L — SIGNIFICANT CHANGE UP (ref 3.5–5.3)
RBC # BLD: 4.09 M/UL — SIGNIFICANT CHANGE UP (ref 3.8–5.2)
RBC # FLD: 13.2 % — SIGNIFICANT CHANGE UP (ref 10.3–14.5)
SODIUM SERPL-SCNC: 141 MMOL/L — SIGNIFICANT CHANGE UP (ref 135–145)
TSH SERPL-MCNC: 0.33 UIU/ML — SIGNIFICANT CHANGE UP (ref 0.27–4.2)
WBC # BLD: 8.6 K/UL — SIGNIFICANT CHANGE UP (ref 3.8–10.5)
WBC # FLD AUTO: 8.6 K/UL — SIGNIFICANT CHANGE UP (ref 3.8–10.5)

## 2017-08-21 PROCEDURE — 70450 CT HEAD/BRAIN W/O DYE: CPT | Mod: 26

## 2017-08-21 RX ADMIN — Medication 100 MILLIGRAM(S): at 05:57

## 2017-08-21 RX ADMIN — Medication 1000 MILLIGRAM(S): at 00:52

## 2017-08-21 RX ADMIN — Medication 200 MILLIGRAM(S): at 09:53

## 2017-08-21 RX ADMIN — Medication 100 MILLIGRAM(S): at 13:28

## 2017-08-21 RX ADMIN — Medication 100 MILLIGRAM(S): at 21:42

## 2017-08-21 RX ADMIN — PANTOPRAZOLE SODIUM 40 MILLIGRAM(S): 20 TABLET, DELAYED RELEASE ORAL at 13:28

## 2017-08-21 RX ADMIN — LOSARTAN POTASSIUM 50 MILLIGRAM(S): 100 TABLET, FILM COATED ORAL at 05:57

## 2017-08-21 RX ADMIN — Medication 1000 MILLIGRAM(S): at 22:35

## 2017-08-21 RX ADMIN — DEXTROSE MONOHYDRATE, SODIUM CHLORIDE, AND POTASSIUM CHLORIDE 75 MILLILITER(S): 50; .745; 4.5 INJECTION, SOLUTION INTRAVENOUS at 17:19

## 2017-08-21 NOTE — PROGRESS NOTE ADULT - ASSESSMENT
55yo F with ulcerative colitis on remicade and prednisone with severe generalized abd pain x 3 days. CT scan likely flare up of colitis, no perforation

## 2017-08-21 NOTE — CONSULT NOTE ADULT - PROBLEM SELECTOR RECOMMENDATION 9
continue IV steriods, hopefully transition to prednisone 40mg / d  outpatient follow up with dr. parra this week after dc  recent flex sig was negative for cmv

## 2017-08-21 NOTE — PROGRESS NOTE ADULT - PROBLEM SELECTOR PLAN 2
seen by GI Dr Ruiz  no antibiotics needed per GI  will discontinue and monitor  no colonoscopy needed as she recently had one

## 2017-08-21 NOTE — PROGRESS NOTE ADULT - SUBJECTIVE AND OBJECTIVE BOX
Patient is a 56y old  Female who presents with a chief complaint of abdominal pain and nausea and vomiting (20 Aug 2017 19:24)      SUBJECTIVE / OVERNIGHT EVENTS: No nausea, vomiting or diarrhea, no fever or chills, no dizziness or chest pain, no dysuria or hematuria, no jt pain or swelling    feels subjectively a lot better    MEDICATIONS  (STANDING):  mesalamine Suppository 1000 milliGRAM(s) Rectal at bedtime  hydrocortisone Enema 100 milliGRAM(s) Rectal at bedtime  hydrocortisone sodium succinate Injectable 100 milliGRAM(s) IV Push every 8 hours  pantoprazole  Injectable 40 milliGRAM(s) IV Push daily  dextrose 5% + sodium chloride 0.9% with potassium chloride 20 mEq/L 1000 milliLiter(s) (75 mL/Hr) IV Continuous <Continuous>  losartan 50 milliGRAM(s) Oral daily    MEDICATIONS  (PRN):  morphine  - Injectable 2 milliGRAM(s) IV Push every 4 hours PRN Severe Pain (7 - 10)  oxyCODONE    IR 10 milliGRAM(s) Oral every 4 hours PRN Moderate Pain (4 - 6)  ondansetron Injectable 4 milliGRAM(s) IV Push every 8 hours PRN Nausea and/or Vomiting  acetaminophen   Tablet. 650 milliGRAM(s) Oral every 6 hours PRN Mild Pain (1 - 3)        CAPILLARY BLOOD GLUCOSE        I&O's Summary    20 Aug 2017 07:01  -  21 Aug 2017 07:00  --------------------------------------------------------  IN: 1180 mL / OUT: 0 mL / NET: 1180 mL    21 Aug 2017 07:01  -  21 Aug 2017 17:14  --------------------------------------------------------  IN: 560 mL / OUT: 0 mL / NET: 560 mL        PHYSICAL EXAM:  GENERAL: NAD, well-developed  HEAD:  Atraumatic, Normocephalic  EYES: EOMI, PERRLA, conjunctiva and sclera clear  NECK: Supple, No JVD  CHEST/LUNG: Clear to auscultation bilaterally; No wheeze  HEART: Regular rate and rhythm; No murmurs, rubs, or gallops  ABDOMEN: Soft, totally nontender, Nondistended; Bowel sounds present  EXTREMITIES:   no edema, No clubbing or cyanosis, + Peripheral Pulses,  PSYCH: AO x 3  NEUROLOGY: non-focal  SKIN: No rashes or lesions    LABS:                        12.5   8.6   )-----------( 92       ( 21 Aug 2017 07:05 )             38.4     08-21    141  |  103  |  10  ----------------------------<  160<H>  4.0   |  27  |  0.57    Ca    8.7      21 Aug 2017 07:05    TPro  6.8  /  Alb  3.6  /  TBili  0.3  /  DBili  x   /  AST  29  /  ALT  35  /  AlkPhos  60  08-20                Consultant(s) Notes Reviewed:      Care Discussed with Consultants/Other Providers:    Contact Number, Dr Cox [phone]

## 2017-08-22 ENCOUNTER — TRANSCRIPTION ENCOUNTER (OUTPATIENT)
Age: 57
End: 2017-08-22

## 2017-08-22 VITALS
SYSTOLIC BLOOD PRESSURE: 128 MMHG | RESPIRATION RATE: 18 BRPM | OXYGEN SATURATION: 99 % | DIASTOLIC BLOOD PRESSURE: 72 MMHG | TEMPERATURE: 98 F | HEART RATE: 62 BPM

## 2017-08-22 DIAGNOSIS — L03.011 CELLULITIS OF RIGHT FINGER: ICD-10-CM

## 2017-08-22 LAB
ANION GAP SERPL CALC-SCNC: 10 MMOL/L — SIGNIFICANT CHANGE UP (ref 5–17)
BUN SERPL-MCNC: 12 MG/DL — SIGNIFICANT CHANGE UP (ref 7–23)
CALCIUM SERPL-MCNC: 8.9 MG/DL — SIGNIFICANT CHANGE UP (ref 8.4–10.5)
CHLORIDE SERPL-SCNC: 108 MMOL/L — SIGNIFICANT CHANGE UP (ref 96–108)
CO2 SERPL-SCNC: 26 MMOL/L — SIGNIFICANT CHANGE UP (ref 22–31)
CREAT SERPL-MCNC: 0.52 MG/DL — SIGNIFICANT CHANGE UP (ref 0.5–1.3)
GLUCOSE SERPL-MCNC: 121 MG/DL — HIGH (ref 70–99)
HCT VFR BLD CALC: 40.4 % — SIGNIFICANT CHANGE UP (ref 34.5–45)
HGB BLD-MCNC: 12.5 G/DL — SIGNIFICANT CHANGE UP (ref 11.5–15.5)
MCHC RBC-ENTMCNC: 29.5 PG — SIGNIFICANT CHANGE UP (ref 27–34)
MCHC RBC-ENTMCNC: 30.9 GM/DL — LOW (ref 32–36)
MCV RBC AUTO: 95.4 FL — SIGNIFICANT CHANGE UP (ref 80–100)
PLATELET # BLD AUTO: 96 K/UL — LOW (ref 150–400)
POTASSIUM SERPL-MCNC: 4.2 MMOL/L — SIGNIFICANT CHANGE UP (ref 3.5–5.3)
POTASSIUM SERPL-SCNC: 4.2 MMOL/L — SIGNIFICANT CHANGE UP (ref 3.5–5.3)
RBC # BLD: 4.23 M/UL — SIGNIFICANT CHANGE UP (ref 3.8–5.2)
RBC # FLD: 13.3 % — SIGNIFICANT CHANGE UP (ref 10.3–14.5)
SODIUM SERPL-SCNC: 144 MMOL/L — SIGNIFICANT CHANGE UP (ref 135–145)
WBC # BLD: 12 K/UL — HIGH (ref 3.8–10.5)
WBC # FLD AUTO: 12 K/UL — HIGH (ref 3.8–10.5)

## 2017-08-22 PROCEDURE — 84443 ASSAY THYROID STIM HORMONE: CPT

## 2017-08-22 PROCEDURE — 82947 ASSAY GLUCOSE BLOOD QUANT: CPT

## 2017-08-22 PROCEDURE — 80053 COMPREHEN METABOLIC PANEL: CPT

## 2017-08-22 PROCEDURE — 87324 CLOSTRIDIUM AG IA: CPT

## 2017-08-22 PROCEDURE — 87040 BLOOD CULTURE FOR BACTERIA: CPT

## 2017-08-22 PROCEDURE — 76700 US EXAM ABDOM COMPLETE: CPT

## 2017-08-22 PROCEDURE — 71045 X-RAY EXAM CHEST 1 VIEW: CPT

## 2017-08-22 PROCEDURE — 86803 HEPATITIS C AB TEST: CPT

## 2017-08-22 PROCEDURE — 85014 HEMATOCRIT: CPT

## 2017-08-22 PROCEDURE — 96374 THER/PROPH/DIAG INJ IV PUSH: CPT | Mod: XU

## 2017-08-22 PROCEDURE — 84132 ASSAY OF SERUM POTASSIUM: CPT

## 2017-08-22 PROCEDURE — 96375 TX/PRO/DX INJ NEW DRUG ADDON: CPT

## 2017-08-22 PROCEDURE — 82435 ASSAY OF BLOOD CHLORIDE: CPT

## 2017-08-22 PROCEDURE — 70450 CT HEAD/BRAIN W/O DYE: CPT

## 2017-08-22 PROCEDURE — 82565 ASSAY OF CREATININE: CPT

## 2017-08-22 PROCEDURE — 85027 COMPLETE CBC AUTOMATED: CPT

## 2017-08-22 PROCEDURE — 80048 BASIC METABOLIC PNL TOTAL CA: CPT

## 2017-08-22 PROCEDURE — 83605 ASSAY OF LACTIC ACID: CPT

## 2017-08-22 PROCEDURE — 83690 ASSAY OF LIPASE: CPT

## 2017-08-22 PROCEDURE — 82803 BLOOD GASES ANY COMBINATION: CPT

## 2017-08-22 PROCEDURE — 76700 US EXAM ABDOM COMPLETE: CPT | Mod: 26

## 2017-08-22 PROCEDURE — 84295 ASSAY OF SERUM SODIUM: CPT

## 2017-08-22 PROCEDURE — 87449 NOS EACH ORGANISM AG IA: CPT

## 2017-08-22 PROCEDURE — 93005 ELECTROCARDIOGRAM TRACING: CPT

## 2017-08-22 PROCEDURE — 82330 ASSAY OF CALCIUM: CPT

## 2017-08-22 PROCEDURE — 74177 CT ABD & PELVIS W/CONTRAST: CPT

## 2017-08-22 PROCEDURE — 99285 EMERGENCY DEPT VISIT HI MDM: CPT | Mod: 25

## 2017-08-22 PROCEDURE — 96376 TX/PRO/DX INJ SAME DRUG ADON: CPT

## 2017-08-22 RX ORDER — ACETAMINOPHEN 500 MG
2 TABLET ORAL
Qty: 0 | Refills: 0 | COMMUNITY
Start: 2017-08-22

## 2017-08-22 RX ORDER — PANTOPRAZOLE SODIUM 20 MG/1
1 TABLET, DELAYED RELEASE ORAL
Qty: 30 | Refills: 0 | OUTPATIENT
Start: 2017-08-22 | End: 2017-09-21

## 2017-08-22 RX ADMIN — PANTOPRAZOLE SODIUM 40 MILLIGRAM(S): 20 TABLET, DELAYED RELEASE ORAL at 12:37

## 2017-08-22 RX ADMIN — Medication 40 MILLIGRAM(S): at 12:37

## 2017-08-22 RX ADMIN — DEXTROSE MONOHYDRATE, SODIUM CHLORIDE, AND POTASSIUM CHLORIDE 75 MILLILITER(S): 50; .745; 4.5 INJECTION, SOLUTION INTRAVENOUS at 06:02

## 2017-08-22 RX ADMIN — Medication 100 MILLIGRAM(S): at 06:03

## 2017-08-22 RX ADMIN — LOSARTAN POTASSIUM 50 MILLIGRAM(S): 100 TABLET, FILM COATED ORAL at 06:02

## 2017-08-22 NOTE — DISCHARGE NOTE ADULT - MEDICATION SUMMARY - MEDICATIONS TO TAKE
I will START or STAY ON the medications listed below when I get home from the hospital:    Apriso 0.375 g oral capsule, extended release  -- 4 cap(s) by mouth once a day  -- Indication: For Ulcerative colitis    Canasa 1000 mg rectal suppository  -- 1 suppository(ies) rectally once a day (at bedtime)  -- Indication: For Ulcerative colitis    hydrocortisone 100 mg/60 mL rectal suspension  --  rectally once a day (at bedtime)  -- Indication: For Ulcerative colitis    predniSONE 20 mg oral tablet  -- 2 tab(s) by mouth once a day  -- Indication: For Ulcerative colitis    acetaminophen 325 mg oral tablet  -- 2 tab(s) by mouth every 6 hours, As needed, Mild Pain (1 - 3)  -- Indication: For Pain    hydrocortisone-pramoxine 2.5%-1% topical cream  -- Apply on skin to affected area every 6 hours  -- Indication: For Ulcerative colitis    Remicade 100 mg intravenous injection  --  intravenous (next dose on 9/15/17)  -- Indication: For Ulcerative colitis    Protonix 40 mg oral granule, enteric coated  -- 1 each by mouth once a day  -- It is very important that you take or use this exactly as directed.  Do not skip doses or discontinue unless directed by your doctor.  Obtain medical advice before taking any non-prescription drugs as some may affect the action of this medication.    -- Indication: For Acid reflux prevention    multivitamin  -- 1 tab(s) by mouth once a day  -- Indication: For supplement I will START or STAY ON the medications listed below when I get home from the hospital:    Apriso 0.375 g oral capsule, extended release  -- 4 cap(s) by mouth once a day  -- Indication: For Ulcerative colitis    Canasa 1000 mg rectal suppository  -- 1 suppository(ies) rectally once a day (at bedtime)  -- Indication: For Ulcerative colitis    hydrocortisone 100 mg/60 mL rectal suspension  --  rectally once a day (at bedtime)  -- Indication: For Ulcerative colitis    predniSONE 20 mg oral tablet  -- 2 tab(s) by mouth once a day  -- Indication: For Ulcerative colitis    acetaminophen 325 mg oral tablet  -- 2 tab(s) by mouth every 6 hours, As needed, Mild Pain (1 - 3)  -- Indication: For Pain    hydrocortisone-pramoxine 2.5%-1% topical cream  -- Apply on skin to affected area every 6 hours  -- Indication: For Ulcerative colitis    doxycycline monohydrate 100 mg oral tablet  -- 1 tab(s) by mouth 2 times a day  -- Avoid prolonged or excessive exposure to direct and/or artificial sunlight while taking this medication.  Do not take this drug if you are pregnant.  Finish all this medication unless otherwise directed by prescriber.  Medication should be taken with plenty of water.    -- Indication: For infection    Remicade 100 mg intravenous injection  --  intravenous (next dose on 9/15/17)  -- Indication: For Ulcerative colitis    Protonix 40 mg oral granule, enteric coated  -- 1 each by mouth once a day  -- It is very important that you take or use this exactly as directed.  Do not skip doses or discontinue unless directed by your doctor.  Obtain medical advice before taking any non-prescription drugs as some may affect the action of this medication.    -- Indication: For Acid reflux prevention    multivitamin  -- 1 tab(s) by mouth once a day  -- Indication: For supplement

## 2017-08-22 NOTE — DISCHARGE NOTE ADULT - CARE PLAN
Principal Discharge DX:	Ulcerative colitis  Goal:	symptoms resolved  Instructions for follow-up, activity and diet:	Follow up with your Gastroenterologist in 1 week-Dr. So   HOME CARE INSTRUCTIONS  Get plenty of rest.  Drink enough water and fluids to keep your urine clear or pale yellow.  Eat a well-balanced diet.  Call your caregiver for follow-up as recommended.  SEEK IMMEDIATE MEDICAL CARE IF:  You develop chills.  You have an oral temperature above 101, not controlled by medicine.  You have extreme weakness, fainting, or dehydration.  You have repeated vomiting.  You develop severe belly (abdominal) pain or are passing bloody or tarry stools  Secondary Diagnosis:	HTN (hypertension)  Instructions for follow-up, activity and diet:	Low salt diet  Activity as tolerated.  Take all medication as prescribed.  Follow up with your medical doctor for routine blood pressure monitoring at your next visit.  Notify your doctor if you have any of the following symptoms:   Dizziness, Lightheadedness, Blurry vision, Headache, Chest pain, Shortness of breath Principal Discharge DX:	Ulcerative colitis  Goal:	symptoms resolved  Instructions for follow-up, activity and diet:	Follow up with your Gastroenterologist in 1 week-Dr. So  Please follow up for Prednisone taper  HOME CARE INSTRUCTIONS  Get plenty of rest.  Drink enough water and fluids to keep your urine clear or pale yellow.  Eat a well-balanced diet.  Call your caregiver for follow-up as recommended.  SEEK IMMEDIATE MEDICAL CARE IF:  You develop chills.  You have an oral temperature above 101, not controlled by medicine.  You have extreme weakness, fainting, or dehydration.  You have repeated vomiting.  You develop severe belly (abdominal) pain or are passing bloody or tarry stools  Secondary Diagnosis:	HTN (hypertension)  Instructions for follow-up, activity and diet:	Low salt diet  Activity as tolerated.  Take all medication as prescribed.  Follow up with your medical doctor for routine blood pressure monitoring at your next visit.  Notify your doctor if you have any of the following symptoms:   Dizziness, Lightheadedness, Blurry vision, Headache, Chest pain, Shortness of breath Principal Discharge DX:	Ulcerative colitis  Goal:	symptoms resolved  Instructions for follow-up, activity and diet:	Follow up with your Gastroenterologist in 1 week-Dr. So  Please follow up for Prednisone taper  HOME CARE INSTRUCTIONS  Get plenty of rest.  Drink enough water and fluids to keep your urine clear or pale yellow.  Eat a well-balanced diet.  Call your caregiver for follow-up as recommended.  SEEK IMMEDIATE MEDICAL CARE IF:  You develop chills.  You have an oral temperature above 101, not controlled by medicine.  You have extreme weakness, fainting, or dehydration.  You have repeated vomiting.  You develop severe belly (abdominal) pain or are passing bloody or tarry stools  Secondary Diagnosis:	HTN (hypertension)  Instructions for follow-up, activity and diet:	Low salt diet  Activity as tolerated.  Take all medication as prescribed.  Follow up with your medical doctor for routine blood pressure monitoring at your next visit.  Notify your doctor if you have any of the following symptoms:   Dizziness, Lightheadedness, Blurry vision, Headache, Chest pain, Shortness of breath  Secondary Diagnosis:	Paronychia of finger, right  Instructions for follow-up, activity and diet:	Please follow up with Dr. Taylor after discharge from the hospital  Take antibiotics as prescribed  Please call your doctor if redness or swelling worsens. Principal Discharge DX:	Ulcerative colitis  Goal:	symptoms resolved  Instructions for follow-up, activity and diet:	Follow up with your Gastroenterologist in 1 week-Dr. So  Please follow up for Prednisone taper  HOME CARE INSTRUCTIONS  Get plenty of rest.  Drink enough water and fluids to keep your urine clear or pale yellow.  Eat a well-balanced diet.  Call your caregiver for follow-up as recommended.  SEEK IMMEDIATE MEDICAL CARE IF:  You develop chills.  You have an oral temperature above 101, not controlled by medicine.  You have extreme weakness, fainting, or dehydration.  You have repeated vomiting.  You develop severe belly (abdominal) pain or are passing bloody or tarry stools  Secondary Diagnosis:	HTN (hypertension)  Instructions for follow-up, activity and diet:	Low salt diet  Activity as tolerated.  Take all medication as prescribed.  Follow up with your medical doctor for routine blood pressure monitoring at your next visit.  Notify your doctor if you have any of the following symptoms:   Dizziness, Lightheadedness, Blurry vision, Headache, Chest pain, Shortness of breath  Secondary Diagnosis:	Paronychia of finger, right  Instructions for follow-up, activity and diet:	Please follow up with Dr. Taylor in 1 week if no improvement  Take antibiotics as prescribed  Soak finger in warm water twice a day  Please call your doctor if redness or swelling worsens. Principal Discharge DX:	Ulcerative colitis  Goal:	symptoms resolved  Instructions for follow-up, activity and diet:	Follow up with your Gastroenterologist in 1 week-Dr. So  Please follow up for Prednisone taper  HOME CARE INSTRUCTIONS  Get plenty of rest.  Drink enough water and fluids to keep your urine clear or pale yellow.  Eat a well-balanced diet.  Call your caregiver for follow-up as recommended.  SEEK IMMEDIATE MEDICAL CARE IF:  You develop chills.  You have an oral temperature above 101, not controlled by medicine.  You have extreme weakness, fainting, or dehydration.  You have repeated vomiting.  You develop severe belly (abdominal) pain or are passing bloody or tarry stools  Secondary Diagnosis:	HTN (hypertension)  Instructions for follow-up, activity and diet:	Low salt diet  Activity as tolerated.  Take all medication as prescribed.  Follow up with your medical doctor for routine blood pressure monitoring at your next visit.  Notify your doctor if you have any of the following symptoms:   Dizziness, Lightheadedness, Blurry vision, Headache, Chest pain, Shortness of breath  Secondary Diagnosis:	Paronychia of finger, right  Instructions for follow-up, activity and diet:	Please follow up with Dr. Taylor in 1 week if no improvement  Take antibiotics as prescribed  Soak finger in warm water twice a day  Please call your doctor if redness or swelling worsens.  Secondary Diagnosis:	Thrombocytopenia  Instructions for follow-up, activity and diet:	Please follow up with Dr. Nicholas in 1 week Principal Discharge DX:	Ulcerative colitis  Goal:	symptoms resolved  Instructions for follow-up, activity and diet:	Follow up with your Gastroenterologist in 1 week-Dr. So  Please follow up for Prednisone taper  HOME CARE INSTRUCTIONS  Get plenty of rest.  Drink enough water and fluids to keep your urine clear or pale yellow.  Eat a well-balanced diet.  Call your caregiver for follow-up as recommended.  SEEK IMMEDIATE MEDICAL CARE IF:  You develop chills.  You have an oral temperature above 101, not controlled by medicine.  You have extreme weakness, fainting, or dehydration.  You have repeated vomiting.  You develop severe belly (abdominal) pain or are passing bloody or tarry stools  Secondary Diagnosis:	HTN (hypertension)  Instructions for follow-up, activity and diet:	Low salt diet  Activity as tolerated.  Follow up with your medical doctor for routine blood pressure monitoring at your next visit.  Notify your doctor if you have any of the following symptoms:   Dizziness, Lightheadedness, Blurry vision, Headache, Chest pain, Shortness of breath  Secondary Diagnosis:	Paronychia of finger, right  Instructions for follow-up, activity and diet:	Please follow up with Dr. Taylor in 1 week if no improvement  Take antibiotics as prescribed  Soak finger in warm water twice a day  Please call your doctor if redness or swelling worsens.  Secondary Diagnosis:	Thrombocytopenia  Instructions for follow-up, activity and diet:	Please follow up with Dr. Nicholas in 1 week

## 2017-08-22 NOTE — CONSULT NOTE ADULT - SUBJECTIVE AND OBJECTIVE BOX
see dictated note  Rec: Abx treatment  Local wound care (soaks)  f/u closely in the next 24 hrs. if not definitively improved, will likely require I&D.
Chief Complaint:  Patient is a 56y old  Female who presents with a chief complaint of abdominal pain and nausea and vomiting (20 Aug 2017 19:24)      HPI: 56F with ulcerative colitis, previously non-complaint with medication, who recently has been having worsening symptoms.   she had a colonoscopy in jan/feb which showed moderate left sided disease  she has failed therapy with ASA and topicals  she was recently started on Remicade received 2 doses and then taped down on steroids to prednisone 20mg/d  she now presents with severe diarrhea  she had a flex sigmoidoscopy 3 weeks ago, biopsies were negative for cmv    Allergies:  No Known Allergies      Medications:  morphine  - Injectable 2 milliGRAM(s) IV Push every 4 hours PRN  oxyCODONE    IR 10 milliGRAM(s) Oral every 4 hours PRN  ondansetron Injectable 4 milliGRAM(s) IV Push every 8 hours PRN  mesalamine Suppository 1000 milliGRAM(s) Rectal at bedtime  hydrocortisone Enema 100 milliGRAM(s) Rectal at bedtime  acetaminophen   Tablet. 650 milliGRAM(s) Oral every 6 hours PRN  hydrocortisone sodium succinate Injectable 100 milliGRAM(s) IV Push every 8 hours  pantoprazole  Injectable 40 milliGRAM(s) IV Push daily  ciprofloxacin   IVPB   IV Intermittent   metroNIDAZOLE  IVPB   IV Intermittent   dextrose 5% + sodium chloride 0.9% with potassium chloride 20 mEq/L 1000 milliLiter(s) IV Continuous <Continuous>  losartan 50 milliGRAM(s) Oral daily  ciprofloxacin   IVPB 400 milliGRAM(s) IV Intermittent every 12 hours  metroNIDAZOLE  IVPB 500 milliGRAM(s) IV Intermittent every 8 hours      PMHX/PSHX:  Ulcerative colitis  Colitis  HTN (hypertension)  No pertinent past medical history  No significant past surgical history      Family history:  Family history of hypertension in father (Father)      Social History:     ROS:     General:  No wt loss, fevers, chills, night sweats, fatigue,   Eyes:  Good vision, no reported pain  ENT:  No sore throat, pain, runny nose, dysphagia  CV:  No pain, palpitations, hypo/hypertension  Resp:  No dyspnea, cough, tachypnea, wheezing  GI:  No pain, No nausea, No vomiting, + diarrhea, No constipation, No weight loss, No fever, No pruritis, + rectal bleeding, No tarry stools, No dysphagia,  :  No pain, bleeding, incontinence, nocturia  Muscle:  No pain, weakness  Neuro:  No weakness, tingling, memory problems  Psych:  No fatigue, insomnia, mood problems, depression  Endocrine:  No polyuria, polydipsia, cold/heat intolerance  Heme:  No petechiae, ecchymosis, easy bruisability  Skin:  No rash, tattoos, scars, edema      PHYSICAL EXAM:   Vital Signs:  Vital Signs Last 24 Hrs  T(C): 36.9 (21 Aug 2017 13:35), Max: 37.1 (21 Aug 2017 09:25)  T(F): 98.5 (21 Aug 2017 13:35), Max: 98.8 (21 Aug 2017 09:25)  HR: 64 (21 Aug 2017 13:35) (52 - 68)  BP: 100/63 (21 Aug 2017 13:35) (100/63 - 163/90)  BP(mean): --  RR: 18 (21 Aug 2017 13:35) (18 - 18)  SpO2: 96% (21 Aug 2017 13:35) (94% - 99%)  Daily     Daily     GENERAL:  Appears stated age, well-groomed, well-nourished, no distress  HEENT:  NC/AT,  conjunctivae clear and pink, no thyromegaly, nodules, adenopathy, no JVD, sclera -anicteric  CHEST:  Full & symmetric excursion, no increased effort, breath sounds clear  HEART:  Regular rhythm, S1, S2, no murmur/rub/S3/S4, no abdominal bruit, no edema  ABDOMEN:  Soft, non-tender, non-distended, normoactive bowel sounds,  no masses ,no hepato-splenomegaly, no signs of chronic liver disease  EXTEREMITIES:  no cyanosis,clubbing or edema  SKIN:  No rash/erythema/ecchymoses/petechiae/wounds/abscess/warm/dry  NEURO:  Alert, oriented, no asterixis, no tremor, no encephalopathy    LABS:                        12.5   8.6   )-----------( 92       ( 21 Aug 2017 07:05 )             38.4     08-21    141  |  103  |  10  ----------------------------<  160<H>  4.0   |  27  |  0.57    Ca    8.7      21 Aug 2017 07:05    TPro  6.8  /  Alb  3.6  /  TBili  0.3  /  DBili  x   /  AST  29  /  ALT  35  /  AlkPhos  60  08-20    LIVER FUNCTIONS - ( 20 Aug 2017 10:44 )  Alb: 3.6 g/dL / Pro: 6.8 g/dL / ALK PHOS: 60 U/L / ALT: 35 U/L RC / AST: 29 U/L / GGT: x               Amylase Serum--      Lipase serum13       Ammonia--      Imaging:

## 2017-08-22 NOTE — PROGRESS NOTE ADULT - PROBLEM SELECTOR PLAN 1
case discussed with her primary gastroenterologist Dr. So  she is s/p 2 doses of Remicade and was tapering her steroids when her symptoms flared  she had a flex sig 3 weeks ago which did not show any CMV  her symptoms improved with IV steroids, which can be transitioned to PO  she will follow up with him in regards to duration of taper  no gi objection to dc planning

## 2017-08-22 NOTE — PROGRESS NOTE ADULT - PROBLEM SELECTOR PROBLEM 1
Nausea & vomiting
Nausea & vomiting
Ulcerative colitis with other complication, unspecified location

## 2017-08-22 NOTE — DISCHARGE NOTE ADULT - PATIENT PORTAL LINK FT
“You can access the [facility] Patient Portal, offered by [facility], by registering with the following website: [email or web address]”

## 2017-08-22 NOTE — PROGRESS NOTE ADULT - SUBJECTIVE AND OBJECTIVE BOX
INTERVAL HPI/OVERNIGHT EVENTS:    patient seen and examined  feels better  tolerating diet  diarrhea improving    MEDICATIONS  (STANDING):  mesalamine Suppository 1000 milliGRAM(s) Rectal at bedtime  hydrocortisone Enema 100 milliGRAM(s) Rectal at bedtime  hydrocortisone sodium succinate Injectable 100 milliGRAM(s) IV Push every 8 hours  pantoprazole  Injectable 40 milliGRAM(s) IV Push daily  dextrose 5% + sodium chloride 0.9% with potassium chloride 20 mEq/L 1000 milliLiter(s) (75 mL/Hr) IV Continuous <Continuous>  losartan 50 milliGRAM(s) Oral daily    MEDICATIONS  (PRN):  morphine  - Injectable 2 milliGRAM(s) IV Push every 4 hours PRN Severe Pain (7 - 10)  oxyCODONE    IR 10 milliGRAM(s) Oral every 4 hours PRN Moderate Pain (4 - 6)  ondansetron Injectable 4 milliGRAM(s) IV Push every 8 hours PRN Nausea and/or Vomiting  acetaminophen   Tablet. 650 milliGRAM(s) Oral every 6 hours PRN Mild Pain (1 - 3)      Allergies    No Known Allergies    Intolerances        ROS:   General:  No wt loss, fevers, chills, night sweats, fatigue,   Eyes:  Good vision, no reported pain  ENT:  No sore throat, pain, runny nose, dysphagia  CV:  No pain, palpitations, hypo/hypertension  Resp:  No dyspnea, cough, tachypnea, wheezing  GI:  No pain, No nausea, No vomiting, + diarrhea, No constipation, No weight loss, No fever, No pruritis, No rectal bleeding, No tarry stools, No dysphagia,  :  No pain, bleeding, incontinence, nocturia  Muscle:  No pain, weakness  Neuro:  No weakness, tingling, memory problems  Psych:  No fatigue, insomnia, mood problems, depression  Endocrine:  No polyuria, polydipsia, cold/heat intolerance  Heme:  No petechiae, ecchymosis, easy bruisability  Skin:  No rash, tattoos, scars, edema      PHYSICAL EXAM:   Vital Signs:  Vital Signs Last 24 Hrs  T(C): 36.6 (22 Aug 2017 06:00), Max: 36.9 (21 Aug 2017 13:35)  T(F): 97.8 (22 Aug 2017 06:00), Max: 98.5 (21 Aug 2017 13:35)  HR: 72 (22 Aug 2017 06:00) (60 - 72)  BP: 144/84 (22 Aug 2017 06:00) (99/60 - 144/84)  BP(mean): --  RR: 16 (22 Aug 2017 06:00) (16 - 18)  SpO2: 97% (22 Aug 2017 06:00) (95% - 97%)  Daily     Daily     GENERAL:  Appears stated age, well-groomed, well-nourished, no distress  HEENT:  NC/AT,  conjunctivae clear and pink, no thyromegaly, nodules, adenopathy, no JVD, sclera -anicteric  CHEST:  Full & symmetric excursion, no increased effort, breath sounds clear  HEART:  Regular rhythm, S1, S2, no murmur/rub/S3/S4, no abdominal bruit, no edema  ABDOMEN:  Soft, non-tender, non-distended, normoactive bowel sounds,  no masses ,no hepato-splenomegaly, no signs of chronic liver disease  EXTEREMITIES:  no cyanosis,clubbing or edema  SKIN:  No rash/erythema/ecchymoses/petechiae/wounds/abscess/warm/dry  NEURO:  Alert, oriented, no asterixis, no tremor, no encephalopathy      LABS:                        12.5   12.0  )-----------( 96       ( 22 Aug 2017 07:05 )             40.4     08-22    144  |  108  |  12  ----------------------------<  121<H>  4.2   |  26  |  0.52    Ca    8.9      22 Aug 2017 07:05    TPro  6.8  /  Alb  3.6  /  TBili  0.3  /  DBili  x   /  AST  29  /  ALT  35  /  AlkPhos  60  08-20          RADIOLOGY & ADDITIONAL TESTS:

## 2017-08-22 NOTE — DISCHARGE NOTE ADULT - PLAN OF CARE
symptoms resolved Follow up with your Gastroenterologist in 1 week-Dr. So   HOME CARE INSTRUCTIONS  Get plenty of rest.  Drink enough water and fluids to keep your urine clear or pale yellow.  Eat a well-balanced diet.  Call your caregiver for follow-up as recommended.  SEEK IMMEDIATE MEDICAL CARE IF:  You develop chills.  You have an oral temperature above 101, not controlled by medicine.  You have extreme weakness, fainting, or dehydration.  You have repeated vomiting.  You develop severe belly (abdominal) pain or are passing bloody or tarry stools Low salt diet  Activity as tolerated.  Take all medication as prescribed.  Follow up with your medical doctor for routine blood pressure monitoring at your next visit.  Notify your doctor if you have any of the following symptoms:   Dizziness, Lightheadedness, Blurry vision, Headache, Chest pain, Shortness of breath Follow up with your Gastroenterologist in 1 week-Dr. So  Please follow up for Prednisone taper  HOME CARE INSTRUCTIONS  Get plenty of rest.  Drink enough water and fluids to keep your urine clear or pale yellow.  Eat a well-balanced diet.  Call your caregiver for follow-up as recommended.  SEEK IMMEDIATE MEDICAL CARE IF:  You develop chills.  You have an oral temperature above 101, not controlled by medicine.  You have extreme weakness, fainting, or dehydration.  You have repeated vomiting.  You develop severe belly (abdominal) pain or are passing bloody or tarry stools Please follow up with Dr. Taylor after discharge from the hospital  Take antibiotics as prescribed  Please call your doctor if redness or swelling worsens. Please follow up with Dr. Taylor in 1 week if no improvement  Take antibiotics as prescribed  Soak finger in warm water twice a day  Please call your doctor if redness or swelling worsens. Please follow up with Dr. Nicholas in 1 week Low salt diet  Activity as tolerated.  Follow up with your medical doctor for routine blood pressure monitoring at your next visit.  Notify your doctor if you have any of the following symptoms:   Dizziness, Lightheadedness, Blurry vision, Headache, Chest pain, Shortness of breath

## 2017-08-22 NOTE — PROGRESS NOTE ADULT - SUBJECTIVE AND OBJECTIVE BOX
Patient is a 56y old  Female who presents with a chief complaint of abdominal pain and nausea and vomiting (22 Aug 2017 12:21)      SUBJECTIVE / OVERNIGHT EVENTS: No nausea, vomiting or diarrhea, no fever or chills, no dizziness or chest pain, no dysuria or hematuria, no jt pain or swelling    MEDICATIONS  (STANDING):  mesalamine Suppository 1000 milliGRAM(s) Rectal at bedtime  hydrocortisone Enema 100 milliGRAM(s) Rectal at bedtime  pantoprazole  Injectable 40 milliGRAM(s) IV Push daily  dextrose 5% + sodium chloride 0.9% with potassium chloride 20 mEq/L 1000 milliLiter(s) (75 mL/Hr) IV Continuous <Continuous>  losartan 50 milliGRAM(s) Oral daily  predniSONE   Tablet 40 milliGRAM(s) Oral daily    MEDICATIONS  (PRN):  morphine  - Injectable 2 milliGRAM(s) IV Push every 4 hours PRN Severe Pain (7 - 10)  oxyCODONE    IR 10 milliGRAM(s) Oral every 4 hours PRN Moderate Pain (4 - 6)  ondansetron Injectable 4 milliGRAM(s) IV Push every 8 hours PRN Nausea and/or Vomiting  acetaminophen   Tablet. 650 milliGRAM(s) Oral every 6 hours PRN Mild Pain (1 - 3)        CAPILLARY BLOOD GLUCOSE        I&O's Summary    21 Aug 2017 07:01  -  22 Aug 2017 07:00  --------------------------------------------------------  IN: 4090 mL / OUT: 0 mL / NET: 4090 mL    22 Aug 2017 07:01  -  22 Aug 2017 14:06  --------------------------------------------------------  IN: 250 mL / OUT: 0 mL / NET: 250 mL        PHYSICAL EXAM:  GENERAL: NAD, well-developed  HEAD:  Atraumatic, Normocephalic  EYES: EOMI, PERRLA, conjunctiva and sclera clear  NECK: Supple, No JVD  CHEST/LUNG: Clear to auscultation bilaterally; No wheeze  HEART: Regular rate and rhythm; No murmurs, rubs, or gallops  ABDOMEN: Soft, totally nontender, Nondistended; Bowel sounds present  EXTREMITIES:   no edema, No clubbing or cyanosis, + Peripheral Pulses,  PSYCH: AO x 3  NEUROLOGY: non-focal  SKIN: No rashes or lesions  LABS:                        12.5   12.0  )-----------( 96       ( 22 Aug 2017 07:05 )             40.4     08-22    144  |  108  |  12  ----------------------------<  121<H>  4.2   |  26  |  0.52    Ca    8.9      22 Aug 2017 07:05                  Consultant(s) Notes Reviewed:      Care Discussed with Consultants/Other Providers:    Contact Number, Dr Cox [phone] Patient is a 56y old  Female who presents with a chief complaint of abdominal pain and nausea and vomiting (22 Aug 2017 12:21)      SUBJECTIVE / OVERNIGHT EVENTS: No nausea, vomiting or diarrhea, no fever or chills, no dizziness or chest pain, no dysuria or hematuria, no jt pain or swelling  c/o right index finger swelling tip    MEDICATIONS  (STANDING):  mesalamine Suppository 1000 milliGRAM(s) Rectal at bedtime  hydrocortisone Enema 100 milliGRAM(s) Rectal at bedtime  pantoprazole  Injectable 40 milliGRAM(s) IV Push daily  dextrose 5% + sodium chloride 0.9% with potassium chloride 20 mEq/L 1000 milliLiter(s) (75 mL/Hr) IV Continuous <Continuous>  losartan 50 milliGRAM(s) Oral daily  predniSONE   Tablet 40 milliGRAM(s) Oral daily    MEDICATIONS  (PRN):  morphine  - Injectable 2 milliGRAM(s) IV Push every 4 hours PRN Severe Pain (7 - 10)  oxyCODONE    IR 10 milliGRAM(s) Oral every 4 hours PRN Moderate Pain (4 - 6)  ondansetron Injectable 4 milliGRAM(s) IV Push every 8 hours PRN Nausea and/or Vomiting  acetaminophen   Tablet. 650 milliGRAM(s) Oral every 6 hours PRN Mild Pain (1 - 3)        CAPILLARY BLOOD GLUCOSE        I&O's Summary    21 Aug 2017 07:01  -  22 Aug 2017 07:00  --------------------------------------------------------  IN: 4090 mL / OUT: 0 mL / NET: 4090 mL    22 Aug 2017 07:01  -  22 Aug 2017 14:06  --------------------------------------------------------  IN: 250 mL / OUT: 0 mL / NET: 250 mL        PHYSICAL EXAM:  GENERAL: NAD, well-developed  HEAD:  Atraumatic, Normocephalic  EYES: EOMI, PERRLA, conjunctiva and sclera clear  NECK: Supple, No JVD  CHEST/LUNG: Clear to auscultation bilaterally; No wheeze  HEART: Regular rate and rhythm; No murmurs, rubs, or gallops  ABDOMEN: Soft, totally nontender, Nondistended; Bowel sounds present  EXTREMITIES:   no edema, No clubbing or cyanosis, + Peripheral Pulses,  right index finger paronychia/collection fluctuant mildly tender  NEUROLOGY: non-focal  SKIN: No rashes or lesions  LABS:                        12.5   12.0  )-----------( 96       ( 22 Aug 2017 07:05 )             40.4     08-22    144  |  108  |  12  ----------------------------<  121<H>  4.2   |  26  |  0.52    Ca    8.9      22 Aug 2017 07:05                  Consultant(s) Notes Reviewed:      Care Discussed with Consultants/Other Providers:    Contact Number, Dr Cox [phone]

## 2017-08-22 NOTE — DISCHARGE NOTE ADULT - CONDITIONS AT DISCHARGE
Patient a&ox4. Patient VSS. Patient Iv removed with no signs/symptoms of redness/swelling. Patient safety maintained.

## 2017-08-22 NOTE — DISCHARGE NOTE ADULT - PROVIDER TOKENS
FREE:[LAST:[Dr. So],PHONE:[(   )    -],FAX:[(   )    -],ADDRESS:[Gastroenterologist]] FREE:[LAST:[Dr. So],PHONE:[(   )    -],FAX:[(   )    -],ADDRESS:[Gastroenterologist]],TOKEN:'[id number] TOKEN:'[id number]',FREE:[LAST:[Dr. So],PHONE:[(   )    -],FAX:[(   )    -],ADDRESS:[Gastroenterologist]],TOKEN:'[id number]' TOKEN:'[id number]:'[id number]',FREE:[LAST:[Dr. So],PHONE:[(   )    -],FAX:[(   )    -],ADDRESS:[Gastroenterologist]]

## 2017-08-22 NOTE — PROGRESS NOTE ADULT - PROBLEM SELECTOR PROBLEM 2
Acute ulcerative colitis without complications
Acute ulcerative colitis without complications
Diarrhea, unspecified type

## 2017-08-22 NOTE — PROGRESS NOTE ADULT - PROBLEM SELECTOR PLAN 5
unclear etiology   likely med induced  heme evaluation as outpatient  Dr Nicholas to evaluation as outpatient
unclear etiology   likely med induced  heme evaluation if still low

## 2017-08-22 NOTE — PROGRESS NOTE ADULT - PROBLEM SELECTOR PLAN 2
increase prednisone to 40 po qd till tapered by outpatient GI  no colonoscopy needed as she recently had one

## 2017-08-22 NOTE — DISCHARGE NOTE ADULT - HOSPITAL COURSE
MD - outpatient follow up with dr. parra this week after discharge with severe generalized abd pain x 3 days. Also reports nausea and vomiting and diarrhea with loose stool. Some bright red blood with stools, no mucus. Every time she moves her bowels she develops headache frontal, intermittent, no focal weakness, no vision changes. Positive tenesmus and pain in rectum due to constant diarrhea.  Dx: Ulcerative colitis flare up, headache     Admit for UC flare: started on Cipro and Metronidazole. IV protonix. , IV steroidss   Pain control  w Morphine and Oxycodone.  Keep NPO except meds for Nausea.  K 3.3 Kcl 10 IV x3   7/20/2017 - Night NP Notified by RN that Pt refusing enema Cortenema.  Pt stated that she was on it in past, and was not effective, so MD started her on oral prednisone.    8/21: CT evidence of mild colitis ---c/w cipro/flagyl/iv steriod ( GI consult pending)           headache  resolved: Ct head neg     08/21 Night PA: GI c/s:   - continue IV steriods, hopefully transition to prednisone 40mg / d  - outpatient follow up with dr. parra this week after dc  - recent flex sig was negative for cmv.    cleared for discharge by all services on pre-adm meds and increased dose of prednisone  follow up with Dr Nicholas on discharge for thrombocytopenia

## 2017-08-22 NOTE — DISCHARGE NOTE ADULT - CARE PROVIDER_API CALL
Dr. So,   Gastroenterologist  Phone: (   )    -  Fax: (   )    - Dr. So,   Gastroenterologist  Phone: (   )    -  Fax: (   )    -    Jonathan Nicholas (PEREZ), Hematology; Medical Oncology  [address] [place] [address]  Phone: [phone]  Fax: [phone] Jonathan Nicholas (PEREZ), Hematology; Medical Oncology  [address] [facility] [address]  [place] [address]  Phone: [phone]  Fax: [phone]    Dr. So,   Gastroenterologist  Phone: (   )    -  Fax: (   )    -    Lynn Taylor), Plastic Surgery  [address] [facility]  [address]  [place], [place] [id number]  Phone: [phone]  Fax: [phone] Jonathan Nicholas (PEREZ), Hematology; Medical Oncology  [address] [facility] [address]  [place] [address]  Phone: [phone]  Fax: [phone]    Lynn Taylor), Plastic Surgery  [address] [facility]  [address]  [place], [place] [id number]  Phone: [phone]  Fax: [phone]    Dr. So,   Gastroenterologist  Phone: (   )    -  Fax: (   )    -

## 2017-08-25 LAB
CULTURE RESULTS: SIGNIFICANT CHANGE UP
CULTURE RESULTS: SIGNIFICANT CHANGE UP
SPECIMEN SOURCE: SIGNIFICANT CHANGE UP
SPECIMEN SOURCE: SIGNIFICANT CHANGE UP

## 2017-12-21 ENCOUNTER — TRANSCRIPTION ENCOUNTER (OUTPATIENT)
Age: 57
End: 2017-12-21

## 2018-02-01 ENCOUNTER — TRANSCRIPTION ENCOUNTER (OUTPATIENT)
Age: 58
End: 2018-02-01

## 2018-07-14 ENCOUNTER — EMERGENCY (EMERGENCY)
Facility: HOSPITAL | Age: 58
LOS: 1 days | Discharge: HOME | End: 2018-07-14
Attending: EMERGENCY MEDICINE | Admitting: EMERGENCY MEDICINE
Payer: COMMERCIAL

## 2018-07-14 VITALS
OXYGEN SATURATION: 96 % | DIASTOLIC BLOOD PRESSURE: 78 MMHG | RESPIRATION RATE: 20 BRPM | TEMPERATURE: 99 F | HEART RATE: 90 BPM | SYSTOLIC BLOOD PRESSURE: 154 MMHG

## 2018-07-14 VITALS
TEMPERATURE: 98 F | RESPIRATION RATE: 30 BRPM | DIASTOLIC BLOOD PRESSURE: 80 MMHG | WEIGHT: 160.06 LBS | SYSTOLIC BLOOD PRESSURE: 168 MMHG | HEART RATE: 92 BPM

## 2018-07-14 LAB
ALBUMIN SERPL ELPH-MCNC: 4.4 G/DL — SIGNIFICANT CHANGE UP (ref 3.3–5)
ALP SERPL-CCNC: 80 U/L — SIGNIFICANT CHANGE UP (ref 40–120)
ALT FLD-CCNC: 21 U/L — SIGNIFICANT CHANGE UP (ref 10–45)
ANION GAP SERPL CALC-SCNC: 14 MMOL/L — SIGNIFICANT CHANGE UP (ref 5–17)
AST SERPL-CCNC: 30 U/L — SIGNIFICANT CHANGE UP (ref 10–40)
BASOPHILS # BLD AUTO: 0 K/UL — SIGNIFICANT CHANGE UP (ref 0–0.2)
BASOPHILS NFR BLD AUTO: 0.5 % — SIGNIFICANT CHANGE UP (ref 0–2)
BILIRUB SERPL-MCNC: 0.2 MG/DL — SIGNIFICANT CHANGE UP (ref 0.2–1.2)
BUN SERPL-MCNC: 10 MG/DL — SIGNIFICANT CHANGE UP (ref 7–23)
CALCIUM SERPL-MCNC: 8.9 MG/DL — SIGNIFICANT CHANGE UP (ref 8.4–10.5)
CHLORIDE SERPL-SCNC: 102 MMOL/L — SIGNIFICANT CHANGE UP (ref 96–108)
CO2 SERPL-SCNC: 25 MMOL/L — SIGNIFICANT CHANGE UP (ref 22–31)
CREAT SERPL-MCNC: 0.6 MG/DL — SIGNIFICANT CHANGE UP (ref 0.5–1.3)
EOSINOPHIL # BLD AUTO: 0.4 K/UL — SIGNIFICANT CHANGE UP (ref 0–0.5)
EOSINOPHIL NFR BLD AUTO: 5.2 % — SIGNIFICANT CHANGE UP (ref 0–6)
GLUCOSE SERPL-MCNC: 106 MG/DL — HIGH (ref 70–99)
HCT VFR BLD CALC: 42.5 % — SIGNIFICANT CHANGE UP (ref 34.5–45)
HGB BLD-MCNC: 14.3 G/DL — SIGNIFICANT CHANGE UP (ref 11.5–15.5)
LYMPHOCYTES # BLD AUTO: 1.8 K/UL — SIGNIFICANT CHANGE UP (ref 1–3.3)
LYMPHOCYTES # BLD AUTO: 25.2 % — SIGNIFICANT CHANGE UP (ref 13–44)
MCHC RBC-ENTMCNC: 30.4 PG — SIGNIFICANT CHANGE UP (ref 27–34)
MCHC RBC-ENTMCNC: 33.7 GM/DL — SIGNIFICANT CHANGE UP (ref 32–36)
MCV RBC AUTO: 90.2 FL — SIGNIFICANT CHANGE UP (ref 80–100)
MONOCYTES # BLD AUTO: 0.6 K/UL — SIGNIFICANT CHANGE UP (ref 0–0.9)
MONOCYTES NFR BLD AUTO: 8.6 % — SIGNIFICANT CHANGE UP (ref 2–14)
NEUTROPHILS # BLD AUTO: 4.2 K/UL — SIGNIFICANT CHANGE UP (ref 1.8–7.4)
NEUTROPHILS NFR BLD AUTO: 60.6 % — SIGNIFICANT CHANGE UP (ref 43–77)
PLATELET # BLD AUTO: 127 K/UL — LOW (ref 150–400)
POTASSIUM SERPL-MCNC: 4 MMOL/L — SIGNIFICANT CHANGE UP (ref 3.5–5.3)
POTASSIUM SERPL-SCNC: 4 MMOL/L — SIGNIFICANT CHANGE UP (ref 3.5–5.3)
PROT SERPL-MCNC: 7.8 G/DL — SIGNIFICANT CHANGE UP (ref 6–8.3)
RBC # BLD: 4.72 M/UL — SIGNIFICANT CHANGE UP (ref 3.8–5.2)
RBC # FLD: 12 % — SIGNIFICANT CHANGE UP (ref 10.3–14.5)
SODIUM SERPL-SCNC: 141 MMOL/L — SIGNIFICANT CHANGE UP (ref 135–145)
WBC # BLD: 7 K/UL — SIGNIFICANT CHANGE UP (ref 3.8–10.5)
WBC # FLD AUTO: 7 K/UL — SIGNIFICANT CHANGE UP (ref 3.8–10.5)

## 2018-07-14 PROCEDURE — 85027 COMPLETE CBC AUTOMATED: CPT

## 2018-07-14 PROCEDURE — 71046 X-RAY EXAM CHEST 2 VIEWS: CPT | Mod: 26

## 2018-07-14 PROCEDURE — 99284 EMERGENCY DEPT VISIT MOD MDM: CPT

## 2018-07-14 PROCEDURE — 71046 X-RAY EXAM CHEST 2 VIEWS: CPT

## 2018-07-14 PROCEDURE — 99283 EMERGENCY DEPT VISIT LOW MDM: CPT | Mod: 25

## 2018-07-14 PROCEDURE — 94640 AIRWAY INHALATION TREATMENT: CPT

## 2018-07-14 PROCEDURE — 80053 COMPREHEN METABOLIC PANEL: CPT

## 2018-07-14 RX ORDER — ALBUTEROL 90 UG/1
1 AEROSOL, METERED ORAL EVERY 4 HOURS
Qty: 0 | Refills: 0 | Status: DISCONTINUED | OUTPATIENT
Start: 2018-07-14 | End: 2018-07-18

## 2018-07-14 RX ORDER — IPRATROPIUM/ALBUTEROL SULFATE 18-103MCG
3 AEROSOL WITH ADAPTER (GRAM) INHALATION ONCE
Qty: 0 | Refills: 0 | Status: COMPLETED | OUTPATIENT
Start: 2018-07-14 | End: 2018-07-14

## 2018-07-14 RX ORDER — ALBUTEROL 90 UG/1
1 AEROSOL, METERED ORAL EVERY 4 HOURS
Qty: 0 | Refills: 0 | Status: COMPLETED | OUTPATIENT
Start: 2018-07-14 | End: 2019-06-12

## 2018-07-14 RX ORDER — ACETAMINOPHEN 500 MG
650 TABLET ORAL ONCE
Qty: 0 | Refills: 0 | Status: COMPLETED | OUTPATIENT
Start: 2018-07-14 | End: 2018-07-14

## 2018-07-14 RX ORDER — SODIUM CHLORIDE 9 MG/ML
1000 INJECTION, SOLUTION INTRAVENOUS ONCE
Qty: 0 | Refills: 0 | Status: COMPLETED | OUTPATIENT
Start: 2018-07-14 | End: 2018-07-14

## 2018-07-14 RX ADMIN — Medication 100 MILLIGRAM(S): at 10:40

## 2018-07-14 RX ADMIN — ALBUTEROL 1 PUFF(S): 90 AEROSOL, METERED ORAL at 13:39

## 2018-07-14 RX ADMIN — SODIUM CHLORIDE 1000 MILLILITER(S): 9 INJECTION, SOLUTION INTRAVENOUS at 10:40

## 2018-07-14 RX ADMIN — Medication 3 MILLILITER(S): at 10:40

## 2018-07-14 RX ADMIN — Medication 650 MILLIGRAM(S): at 10:40

## 2018-07-14 NOTE — ED PROVIDER NOTE - PLAN OF CARE
1. Return to ED for worsening, progressive or any other concerning symptoms   2. Follow up with your primary care doctor in 2-3days  3. Take Tessalon Perles 100mg twice a day for cough as needed.  4. Keep yourself well hydrated.  5. Take albuterol inhaler 2-4 puffs every 4-6 hours as needed for cough.

## 2018-07-14 NOTE — ED PROVIDER NOTE - ATTENDING CONTRIBUTION TO CARE
57 year old female with pmhx of colitis on Entyvio presents from PMD's office with wheezing, progressively worsening SOB and cough with lungs cta after nebs, cxr, labs nl likely bronchitis on abx last month, likely viral, no sob.

## 2018-07-14 NOTE — ED PROVIDER NOTE - MEDICAL DECISION MAKING DETAILS
57 year old female with ulcerative colitis on immunosuppressions presents with cough for 3 days with worsening SOB and pleuritic chest pain associated with cough and body aches. On lung exam with diffused wheezing with right lower lobe crackles. Concerned for pneumonia. Will do chest XR, hydrate and reassess. 57 YOF pmh UC on immunotherapy p/w cough, chest pain with cough, SOB, chills. Diffuse wheezing on exam with crackles in RLL. Will get chest XR, hydrate and reassess.

## 2018-07-14 NOTE — ED PROVIDER NOTE - CARE PLAN
Principal Discharge DX:	Cough  Assessment and plan of treatment:	1. Return to ED for worsening, progressive or any other concerning symptoms   2. Follow up with your primary care doctor in 2-3days  3. Take Tessalon Perles 100mg twice a day for cough as needed.  4. Keep yourself well hydrated.  5. Take albuterol inhaler 2-4 puffs every 4-6 hours as needed for cough.

## 2018-07-14 NOTE — ED PROVIDER NOTE - NS ED ROS FT
CONSTITUTIONAL: No fevers, +chills  Eyes: no visual changes  Ears: no ear drainage, no ear pain  Nose: no nasal congestion  Mouth/Throat: no sore throat  Cardiovascular: see hpi   Respiratory: see hpi   Gastrointestinal: No n/v/d, no abd pain  Genitourinary: no dysuria, no hematuria  SKIN: no rashes.  NEURO: no headache  PSYCHIATRIC: no known mental health issues.

## 2018-07-14 NOTE — ED PROVIDER NOTE - OBJECTIVE STATEMENT
57 year old female with pmhx of colitis on Entyvio presents from PMD's office for "asthma attack" with worsening SOB and cough with yellow phlegm and generalized body aches x3 days associated with pleuritic chest pain radiating to back. Also complains of urinary frequency. Denies hx of asthma. Denies nausea or vomiting. Denies fever but + for chills. 57 year old female with pmhx of colitis on Entyvio presents from PMD's office for "asthma attack" with worsening SOB and cough with yellow phlegm and generalized body aches x3 days associated with chest pain only while coughing and diffused back pain. Also complains of urinary frequency. Denies hx of asthma. Denies nausea or vomiting. Denies fever but + for chills. 57 year old female with pmhx of colitis on Entyvio presents from PMD's office with wheezing, progressively worsening SOB and cough over past 3 days with yellow phlegm and generalized body aches x3 days. Pt has chest pain only while coughing, also complaining of diffuse back pain. No chest pain radiating to back. Pain is only with cough. Also complains of urinary frequency, no urgency or dysuria. Denies hx of asthma. Denies nausea or vomiting. +chills, no fever as pt hasn't checked at home but states she feels hot.

## 2018-07-14 NOTE — ED ADULT NURSE NOTE - OBJECTIVE STATEMENT
57 y.o female pmh ulcerative colitis and HTN presenting to ED c/o worsening cough, sob, and wheezing x the passed few days. pt states she has a productive cough that has become more persistent with wheezing and tachypnea present. pt denies any known fever, no cp, sick contacts, nausea, vomiting, weakness, body aches, or recent travel outside the [place] pt states her cough has now become constant causing her to be unable to even lay down or sleep d/t the persistent coughing and feeling sob. states she is better when sitting upright and at rest. worse upon exertion and with positional changes. pulse ox of 98% on RA, RR of 28 upon assessment with pt unable to speak in full sentences d/t her cough. no acute respiratory distress or difficulty breathing present. wheezes present bilaterally. duoneb administered. labs and line obtained, results pending,. family at the bedside. safety maintained.

## 2019-01-08 NOTE — ED ADULT TRIAGE NOTE - WEIGHT IN LBS
PHYSICAL THERAPY Initial Evaluation & Discharge:    Date: 1/8/2019  Recorded diagnosis/complaint at time of admission:  Active Hospital Problems    Diagnosis Date Noted   • Numbness 01/08/2019     Priority: Low     Co-morbidities:   Patient Active Problem List   Diagnosis   • Abscess of skin or subcutaneous tissue   • Hyperlipidemia   • Esophageal reflux   • Heel pain   • Neuritis   • Hearing loss   • Lesion of buccal mucosa   • Actinic keratosis   • Lentigines   • Other seborrheic keratosis   • Cherry angioma   • Acute allergic conjunctivitis   • LBP (low back pain)   • Vestibular dysfunction   • Hypocalcemia   • Cellulitis of left foot due to methicillin-resistant Staphylococcus aureus   • BONNIE (acute kidney injury) (CMS/[facility])   • Blister of leg   • Cellulitis   • Cellulitis and abscess of leg   • Osteoarthrosis, unspecified whether generalized or localized, lower leg   • MMT (medial meniscus tear)   • Wax in ear   • Tear of medial cartilage or meniscus of knee, current   • Arthritis, degenerative   • Internal derangement of left knee   • Tear, knee, medial meniscus   • Bilateral hearing loss   • Age-related osteoporosis without current pathological fracture   • Achilles tendinitis of left lower extremity   • PAC (premature atrial contraction)   • Combined forms of age-related cataract of both eyes   • Arthritis of right knee   • Numbness     Clinical Presentation: stable and/or uncomplicated characteristics  Treatment Diagnosis: Impaired Gait/Locomotion Deficits and Impaired Balance    Therapy Precautions:  s/p tPA    Activity: As tolerated    Cognition: Alert and [place] x3      SUBJECTIVE:   Pt. Reported agreeable to therapy  Pt's personal goal for therapy: return home    Pain:  None Reported  None Observed     OBSERVATION:   Pt is utilizing Pulse Ox, Blood Pressure Cuff, Telemetry and Capped IV  Skin Integrity: Intact  Edema: none  Collaboration with: Nurse Reece    Vital Signs: Monitored and within acceptable  limits    ROM (degrees):  UE: within functional limits, no limitations noted during mobility completion  LE: within functional limits; noticeable out-toeing on right; baseline    Strength (out of 5 in available AROM):  UE: within functional limits , no limitations noted during mobility completion  LE: within functional limits , no limitations noted during mobility completion  Abdominal: within functional limits , no limitations noted during mobility completion    Neurological:  Coordination: intact, isolated opposition, finger to nose, heel to shin bilaterally  Sensation: intact, to light touch, UEs and LEs  Tone: intact  Vision: intact, smooth pursuits and convergence testing    Balance:   Static Sitting: intact  Static Standing: intact    Outcome Measure:   AM-PAC Outcomes  Basic Mobility Domain  How much help from another person does the patient currently need? *  Task Score  Norm   1. Turning from back to side while in flat bed without use of bedrails? 4 - None   2. Moving from lying on back to sittin - None   3. Moving to and from a bed to a chair (including wheelchair) 4 - None   4. Standing up from a chair using your arms 4 - None   5. To walk in a hospital room? 4 - None   6. Climbing 3-5 steps with a railing?  4 - None   Raw Score:    Converted Score: 57.68 (Raw score = 24)   G code conversion CH: 0% impairment (Raw score: 24)     Date Completed:2019    Converted score >42.9 predictive of discharge to home  *Score based on clinical judgment/expected performance, may not have been performed during this session          MOBILITY:    Bed:   Not addressed this session    Transfers:   Device Used: no assistive device, gait belt  Sit to Stand: Modified [place] (mod I)  Stand to Sit: Modified [place] (mod I)  Reason for Assistance: requires increased time to complete, safety due to initial trial    Ambulation:   Assistance Level: Stand By Assistance (SBA), progressing to mod I  Distance:  200 feet  Device Used: no assistive device and gait belt  Gait Mechanics: step through pattern, decreased josse, wide ROXIE with right out-toing, increased lateral sway without overt LOB  Reason for Assistance: requires increased time to complete, safety due to initial trial, unsteadiness    Per patient, gait is at baseline      Stairs:   Assistance Level: Stand By Assistance (SBA), progressing to mod I  Quantity: 14 stairs   Device Used: no assistive device and gait belt  Railings when ascending stairs: left railing  Mechanics: step to pattern, ascending forwards, descending forwards  Reason for Assistance: requires increased time to complete, safety due to initial trial, verbal cues for sequencing and technique    Exercises:  Not addressed this session     Activity Tolerance: no limits in patient's ability to participate in session     GOALS:     NO GOALS     PLAN OF CARE:    PT Frequency: (Initial Evaluation Only)  Duration: 1 session  Treatment/Interventions: Gait training, Stairs retraining     RECOMMENDATIONS/EDUCATION:    Learner: patient  Readiness to Learn: acceptance  Learning Method: Verbal  Barriers to Learning: no barriers apparent at this time  Learning Evaluation: Verbalizes understanding  Teaching Content: Equipment, Safety Awareness, Functional Mobility and Role of physical therapy in the hospital setting  Please reference the patient education activity for further information regarding the patient's learning assessment.  Equipment for discharge: standard cane for community ambulation - recommended to patient who reports he will \"think about it\"       Order Status: no order placed        Delivery Status: not applicable, patient owns necessary equipment     See doc flowsheet (PT assess/treat/goals/charges) for specific information regarding time spent with patient.     Treatment Plan for Next Session: none, d/c PT    The plan of care and goals were established with the patient and he is in agreement.      ASSESSMENT:       Patient presents to physical therapy completing functional mobility at baseline level.  Skilled physical therapy no longer required at this time.     Recommendation for Discharge: PT: Home(patient considering OP PT)                 Recommendations for Discharge: SLP: Home  After today's session this therapist is recommending the above location for optimal discharge.  This recommendation is supported by the patient reports complete resolution of admitting symptoms and is modified independent with the completion of mobility tasks.                   160

## 2019-02-26 PROBLEM — K52.9 NONINFECTIVE GASTROENTERITIS AND COLITIS, UNSPECIFIED: Chronic | Status: ACTIVE | Noted: 2017-07-14

## 2019-02-26 PROBLEM — I10 ESSENTIAL (PRIMARY) HYPERTENSION: Chronic | Status: ACTIVE | Noted: 2017-02-08

## 2019-02-26 PROBLEM — K51.90 ULCERATIVE COLITIS, UNSPECIFIED, WITHOUT COMPLICATIONS: Chronic | Status: ACTIVE | Noted: 2017-08-20

## 2019-03-18 ENCOUNTER — EMERGENCY (EMERGENCY)
Facility: HOSPITAL | Age: 59
LOS: 1 days | Discharge: HOME | End: 2019-03-18
Attending: EMERGENCY MEDICINE
Payer: COMMERCIAL

## 2019-03-18 VITALS
RESPIRATION RATE: 18 BRPM | DIASTOLIC BLOOD PRESSURE: 88 MMHG | HEART RATE: 71 BPM | TEMPERATURE: 98 F | SYSTOLIC BLOOD PRESSURE: 183 MMHG | OXYGEN SATURATION: 98 %

## 2019-03-18 VITALS
TEMPERATURE: 98 F | HEIGHT: 60 IN | SYSTOLIC BLOOD PRESSURE: 179 MMHG | DIASTOLIC BLOOD PRESSURE: 111 MMHG | RESPIRATION RATE: 17 BRPM | HEART RATE: 76 BPM | WEIGHT: 164.91 LBS | OXYGEN SATURATION: 99 %

## 2019-03-18 LAB
ALBUMIN SERPL ELPH-MCNC: 4.2 G/DL — SIGNIFICANT CHANGE UP (ref 3.3–5)
ALP SERPL-CCNC: 83 U/L — SIGNIFICANT CHANGE UP (ref 40–120)
ALT FLD-CCNC: 21 U/L — SIGNIFICANT CHANGE UP (ref 10–45)
ANION GAP SERPL CALC-SCNC: 10 MMOL/L — SIGNIFICANT CHANGE UP (ref 5–17)
AST SERPL-CCNC: 24 U/L — SIGNIFICANT CHANGE UP (ref 10–40)
BASOPHILS # BLD AUTO: 0 K/UL — SIGNIFICANT CHANGE UP (ref 0–0.2)
BASOPHILS NFR BLD AUTO: 0.1 % — SIGNIFICANT CHANGE UP (ref 0–2)
BILIRUB SERPL-MCNC: 0.1 MG/DL — LOW (ref 0.2–1.2)
BUN SERPL-MCNC: 21 MG/DL — SIGNIFICANT CHANGE UP (ref 7–23)
CALCIUM SERPL-MCNC: 9.5 MG/DL — SIGNIFICANT CHANGE UP (ref 8.4–10.5)
CHLORIDE SERPL-SCNC: 105 MMOL/L — SIGNIFICANT CHANGE UP (ref 96–108)
CO2 SERPL-SCNC: 25 MMOL/L — SIGNIFICANT CHANGE UP (ref 22–31)
CREAT SERPL-MCNC: 0.5 MG/DL — SIGNIFICANT CHANGE UP (ref 0.5–1.3)
EOSINOPHIL # BLD AUTO: 0.3 K/UL — SIGNIFICANT CHANGE UP (ref 0–0.5)
EOSINOPHIL NFR BLD AUTO: 2.9 % — SIGNIFICANT CHANGE UP (ref 0–6)
GLUCOSE SERPL-MCNC: 118 MG/DL — HIGH (ref 70–99)
HCT VFR BLD CALC: 41.7 % — SIGNIFICANT CHANGE UP (ref 34.5–45)
HGB BLD-MCNC: 13.6 G/DL — SIGNIFICANT CHANGE UP (ref 11.5–15.5)
LYMPHOCYTES # BLD AUTO: 3.4 K/UL — HIGH (ref 1–3.3)
LYMPHOCYTES # BLD AUTO: 34.2 % — SIGNIFICANT CHANGE UP (ref 13–44)
MCHC RBC-ENTMCNC: 30.4 PG — SIGNIFICANT CHANGE UP (ref 27–34)
MCHC RBC-ENTMCNC: 32.5 GM/DL — SIGNIFICANT CHANGE UP (ref 32–36)
MCV RBC AUTO: 93.3 FL — SIGNIFICANT CHANGE UP (ref 80–100)
MONOCYTES # BLD AUTO: 0.7 K/UL — SIGNIFICANT CHANGE UP (ref 0–0.9)
MONOCYTES NFR BLD AUTO: 6.9 % — SIGNIFICANT CHANGE UP (ref 2–14)
NEUTROPHILS # BLD AUTO: 5.5 K/UL — SIGNIFICANT CHANGE UP (ref 1.8–7.4)
NEUTROPHILS NFR BLD AUTO: 55.9 % — SIGNIFICANT CHANGE UP (ref 43–77)
PLATELET # BLD AUTO: 149 K/UL — LOW (ref 150–400)
POTASSIUM SERPL-MCNC: 4.2 MMOL/L — SIGNIFICANT CHANGE UP (ref 3.5–5.3)
POTASSIUM SERPL-SCNC: 4.2 MMOL/L — SIGNIFICANT CHANGE UP (ref 3.5–5.3)
PROT SERPL-MCNC: 7.4 G/DL — SIGNIFICANT CHANGE UP (ref 6–8.3)
RBC # BLD: 4.47 M/UL — SIGNIFICANT CHANGE UP (ref 3.8–5.2)
RBC # FLD: 11.8 % — SIGNIFICANT CHANGE UP (ref 10.3–14.5)
SODIUM SERPL-SCNC: 140 MMOL/L — SIGNIFICANT CHANGE UP (ref 135–145)
WBC # BLD: 9.8 K/UL — SIGNIFICANT CHANGE UP (ref 3.8–10.5)
WBC # FLD AUTO: 9.8 K/UL — SIGNIFICANT CHANGE UP (ref 3.8–10.5)

## 2019-03-18 PROCEDURE — 99283 EMERGENCY DEPT VISIT LOW MDM: CPT

## 2019-03-18 PROCEDURE — 99284 EMERGENCY DEPT VISIT MOD MDM: CPT | Mod: 25

## 2019-03-18 PROCEDURE — 93010 ELECTROCARDIOGRAM REPORT: CPT | Mod: NC

## 2019-03-18 PROCEDURE — 85027 COMPLETE CBC AUTOMATED: CPT

## 2019-03-18 PROCEDURE — 80053 COMPREHEN METABOLIC PANEL: CPT

## 2019-03-18 PROCEDURE — 82962 GLUCOSE BLOOD TEST: CPT

## 2019-03-18 PROCEDURE — 93005 ELECTROCARDIOGRAM TRACING: CPT

## 2019-03-18 NOTE — ED ADULT TRIAGE NOTE - ARRIVAL FROM
Telephone Encounter by Charlie Fried DO at 06/27/18 10:18 AM     Author:  Charlie Fried DO Service:  (none) Author Type:  Physician     Filed:  06/27/18 10:19 AM Encounter Date:  6/27/2018 Status:  Signed     :  Charlie Fried DO (Physician)            We can change to CT scan kidney protocol as recommended.[ND1.1M]      Revision History        User Key Date/Time User Provider Type Action    > ND1.1 06/27/18 10:19 AM Charlie Fried DO Physician Sign    M - Manual             Home

## 2019-03-18 NOTE — ED PROVIDER NOTE - ATTENDING CONTRIBUTION TO CARE
I performed a history and physical exam of the patient and discussed their management with the resident and /or advanced care provider. I reviewed the resident and /or ACP's note and agree with the documented findings and plan of care. My medical decison making and observations are found above.  Abd soft, lungs clear

## 2019-03-18 NOTE — ED PROVIDER NOTE - OBJECTIVE STATEMENT
58 F h/o htn, UC on mesalamine, p/w lightheaded this AM. Earlier this AM developed sensation of heat from her feet to her head, and felt lightheaded. Not feeling better. Denies having CP/SOB or recent illnesses. Had taken her BP after and was elevated, then took her BP meds at home.

## 2019-03-18 NOTE — ED STATDOCS - OBJECTIVE STATEMENT
58 year old F with pmhx of HTN and colitis c/p near syncopal episode. Pt was on her work break when she began to feel weak and nausea. +mild chest pain. Ate a light sandwich this morning. Currently feels anxious. Notes that 2 weeks ago she began to bleed from nose and mouth, where she had similar sx of near syncope. Denies SOB, abd pain, dysuria. headache, vomiting. Does not take aspirin. 58 year old F with pmhx of HTN and colitis c/o near syncopal episode. Pt was on her work break when she began to feel weak and nausea. +mild chest pain. Ate a light sandwich this morning. Currently feels anxious. Notes that 2 weeks ago she began to bleed from nose and mouth, where she had similar sx of near syncope. Denies SOB, abd pain, dysuria. headache, vomiting. Does not take aspirin. 58 year old F with pmhx of HTN and ulcerative colitis c/o near syncopal episode. Pt was on her work break when she began to feel weak and nausea. +mild chest pain. Ate a light sandwich this morning. Currently feels anxious. Notes that 2 weeks ago she began to bleed from nose and mouth, where she had similar sx of near syncope. Denies SOB, abd pain, dysuria. headache, vomiting. Does not take aspirin.  Takes mesalamine suppository daily, antivia every 2 months, had a colonscopy 2 weeks ago which was normal.

## 2019-03-18 NOTE — ED ADULT NURSE NOTE - OBJECTIVE STATEMENT
57 y/o F, reported to ED from home. A&ox3, c/o lightheadedness/dizziness. Pt reports that she had sudden onset dizziness that started last night. Pt reports that her symptoms resolved last night and then started again when she got to work. Pt reports that she feels lightheaded and had some blurry vision earlier. Pt reports that her vision has resolved 57 y/o F, reported to ED from home. A&ox3, c/o lightheadedness/dizziness. Pt reports that she had sudden onset dizziness that started last night. Pt reports that her symptoms resolved last night and then started again when she got to work. Pt reports that she feels lightheaded and had some blurry vision earlier. Pt reports that her vision has resolved. Pt denies that the room is spinning. Pt reports mild nausea earlier but denies nausea currently. Pt denies photosensitivity or sensitivity to noise. Pt denies LOC, SOB, C/P, H/A, N/V/D, abd pain. Pt denies fever or chills. Pt reports mild chest discomfort yesterday but none today. Pt denies numbness or tingling. Will continue to monitor pt. Pt placed on cardiac monitor.

## 2019-03-18 NOTE — ED STATDOCS - NS_ ATTENDINGSCRIBEDETAILS _ED_A_ED_FT
I personally reviewed the documentation recorded by the scribe in my presence and it accurately and completely records my words and action.

## 2019-03-18 NOTE — ED PROVIDER NOTE - NSFOLLOWUPINSTRUCTIONS_ED_ALL_ED_FT
You were seen for lightheadedness. Your labwork and EKG were normal. Please see your doctor in 2-3 days. Return to ER for chest pain, shortness of breath, sweating.

## 2019-03-18 NOTE — ED PROVIDER NOTE - CLINICAL SUMMARY MEDICAL DECISION MAKING FREE TEXT BOX
58 F with episode of lightheadedness and heat from feet to head. Now asymptomatic. Labs unremarkable. DC home with outpatient follow up 58 F with episode of lightheadedness and heat from feet to head. Now asymptomatic. Labs unremarkable. DC home with outpatient follow up  [place]eewee: feeling flushed and concerned about blood pressure. no focal weakness. no syncope. no chest pain, no sob.  Will evaluate for presyncope with low risk of heart disease.

## 2019-04-15 ENCOUNTER — RESULT REVIEW (OUTPATIENT)
Age: 59
End: 2019-04-15

## 2019-04-17 ENCOUNTER — OUTPATIENT (OUTPATIENT)
Dept: OUTPATIENT SERVICES | Facility: HOSPITAL | Age: 59
LOS: 1 days | End: 2019-04-17
Payer: COMMERCIAL

## 2019-04-17 ENCOUNTER — APPOINTMENT (OUTPATIENT)
Dept: ULTRASOUND IMAGING | Facility: IMAGING CENTER | Age: 59
End: 2019-04-17
Payer: COMMERCIAL

## 2019-04-17 ENCOUNTER — APPOINTMENT (OUTPATIENT)
Dept: MAMMOGRAPHY | Facility: IMAGING CENTER | Age: 59
End: 2019-04-17
Payer: COMMERCIAL

## 2019-04-17 DIAGNOSIS — Z00.00 ENCOUNTER FOR GENERAL ADULT MEDICAL EXAMINATION WITHOUT ABNORMAL FINDINGS: ICD-10-CM

## 2019-04-17 DIAGNOSIS — R92.2 INCONCLUSIVE MAMMOGRAM: ICD-10-CM

## 2019-04-17 PROCEDURE — 77063 BREAST TOMOSYNTHESIS BI: CPT | Mod: 26

## 2019-04-17 PROCEDURE — 77067 SCR MAMMO BI INCL CAD: CPT | Mod: 26

## 2019-04-17 PROCEDURE — 77067 SCR MAMMO BI INCL CAD: CPT

## 2019-04-17 PROCEDURE — 76641 ULTRASOUND BREAST COMPLETE: CPT

## 2019-04-17 PROCEDURE — 76641 ULTRASOUND BREAST COMPLETE: CPT | Mod: 26,50

## 2019-04-17 PROCEDURE — 77063 BREAST TOMOSYNTHESIS BI: CPT

## 2019-05-02 ENCOUNTER — APPOINTMENT (OUTPATIENT)
Dept: ULTRASOUND IMAGING | Facility: IMAGING CENTER | Age: 59
End: 2019-05-02
Payer: COMMERCIAL

## 2019-05-02 ENCOUNTER — OUTPATIENT (OUTPATIENT)
Dept: OUTPATIENT SERVICES | Facility: HOSPITAL | Age: 59
LOS: 1 days | End: 2019-05-02
Payer: COMMERCIAL

## 2019-05-02 DIAGNOSIS — Z00.8 ENCOUNTER FOR OTHER GENERAL EXAMINATION: ICD-10-CM

## 2019-05-02 PROCEDURE — 76642 ULTRASOUND BREAST LIMITED: CPT | Mod: 26,LT

## 2019-05-02 PROCEDURE — 76642 ULTRASOUND BREAST LIMITED: CPT

## 2019-11-04 ENCOUNTER — APPOINTMENT (OUTPATIENT)
Dept: ULTRASOUND IMAGING | Facility: IMAGING CENTER | Age: 59
End: 2019-11-04
Payer: COMMERCIAL

## 2019-11-04 ENCOUNTER — OUTPATIENT (OUTPATIENT)
Dept: OUTPATIENT SERVICES | Facility: HOSPITAL | Age: 59
LOS: 1 days | End: 2019-11-04
Payer: COMMERCIAL

## 2019-11-04 DIAGNOSIS — Z00.00 ENCOUNTER FOR GENERAL ADULT MEDICAL EXAMINATION WITHOUT ABNORMAL FINDINGS: ICD-10-CM

## 2019-11-04 PROCEDURE — 76642 ULTRASOUND BREAST LIMITED: CPT | Mod: 26,LT

## 2019-11-04 PROCEDURE — 76642 ULTRASOUND BREAST LIMITED: CPT

## 2019-11-06 NOTE — ED PROVIDER NOTE - INTERPRETATION
Cardiology Progress Note    Pt has been up to bathroom   Breathing is better but legs still swollen    Imp:  Acute on chronic diastolic heart failure, with medication non-compliance; echo EF stable at 50-55%. Afib with failed CV on sotalol; now switched to amiodarone; rate well controlled  HTN  Sleep apnea  Morbid obesity    Rec:  Another day of diuresis; home tomorrow  Compression stockings  Replace K    Exam:  Blood pressure 103/60, pulse 62, temperature 97.5 °F (36.4 °C), resp. rate 18, height 5' 9\" (1.753 m), weight 150.2 kg (331 lb 1.6 oz), SpO2 95 %.   Lungs clear  Cor irreg with no gallop  Mod edema    K+ 3.4    Pranay Perez MD normal sinus rhythm

## 2020-04-12 NOTE — ED PROVIDER NOTE - NS ED ATTENDING STATEMENT MOD
DISPLAY PLAN FREE TEXT I have personally seen and examined this patient.  I have fully participated in the care of this patient. I have reviewed all pertinent clinical information, including history, physical exam, plan and the Resident’s note and agree except as noted.

## 2020-05-05 LAB
SARS-COV-2 IGG SERPL IA-ACNC: <0.1 INDEX
SARS-COV-2 IGG SERPL QL IA: NEGATIVE

## 2020-07-09 NOTE — ED ADULT TRIAGE NOTE - CADM TRG TX PRIOR TO ARRIVAL
Pt bleeding from her inner ear , would like a nurse to call her back
Spoke to pt, states her daughter noticed this morning that her R ear was bleeding. Pt states blood was coming out of the ear. Pt denies using q-tips recently. No known recent trauma. Pt states she always has ringing in her ear. Denies ear pain.  States ear
none

## 2020-08-24 ENCOUNTER — EMERGENCY (EMERGENCY)
Facility: HOSPITAL | Age: 60
LOS: 1 days | Discharge: HOME | End: 2020-08-24
Attending: STUDENT IN AN ORGANIZED HEALTH CARE EDUCATION/TRAINING PROGRAM
Payer: COMMERCIAL

## 2020-08-24 VITALS
DIASTOLIC BLOOD PRESSURE: 81 MMHG | OXYGEN SATURATION: 99 % | RESPIRATION RATE: 18 BRPM | TEMPERATURE: 98 F | HEART RATE: 81 BPM | SYSTOLIC BLOOD PRESSURE: 130 MMHG

## 2020-08-24 VITALS
HEIGHT: 60 IN | TEMPERATURE: 98 F | SYSTOLIC BLOOD PRESSURE: 159 MMHG | HEART RATE: 83 BPM | RESPIRATION RATE: 18 BRPM | WEIGHT: 169.98 LBS | OXYGEN SATURATION: 96 % | DIASTOLIC BLOOD PRESSURE: 101 MMHG

## 2020-08-24 PROCEDURE — 73120 X-RAY EXAM OF HAND: CPT

## 2020-08-24 PROCEDURE — 73130 X-RAY EXAM OF HAND: CPT | Mod: 26,RT

## 2020-08-24 PROCEDURE — 73140 X-RAY EXAM OF FINGER(S): CPT

## 2020-08-24 PROCEDURE — 26750 TREAT FINGER FRACTURE EACH: CPT | Mod: 54

## 2020-08-24 PROCEDURE — 99283 EMERGENCY DEPT VISIT LOW MDM: CPT | Mod: 57

## 2020-08-24 PROCEDURE — 99284 EMERGENCY DEPT VISIT MOD MDM: CPT | Mod: 25

## 2020-08-24 PROCEDURE — 26750 TREAT FINGER FRACTURE EACH: CPT | Mod: F8

## 2020-08-24 PROCEDURE — 73130 X-RAY EXAM OF HAND: CPT

## 2020-08-24 RX ORDER — IBUPROFEN 200 MG
1 TABLET ORAL
Qty: 15 | Refills: 0
Start: 2020-08-24 | End: 2020-08-28

## 2020-08-24 RX ORDER — ACETAMINOPHEN 500 MG
975 TABLET ORAL ONCE
Refills: 0 | Status: COMPLETED | OUTPATIENT
Start: 2020-08-24 | End: 2020-08-24

## 2020-08-24 RX ORDER — IBUPROFEN 200 MG
600 TABLET ORAL ONCE
Refills: 0 | Status: COMPLETED | OUTPATIENT
Start: 2020-08-24 | End: 2020-08-24

## 2020-08-24 RX ADMIN — Medication 600 MILLIGRAM(S): at 15:36

## 2020-08-24 RX ADMIN — Medication 975 MILLIGRAM(S): at 15:36

## 2020-08-24 RX ADMIN — Medication 600 MILLIGRAM(S): at 15:34

## 2020-08-24 RX ADMIN — Medication 975 MILLIGRAM(S): at 15:34

## 2020-08-24 NOTE — ED PROVIDER NOTE - PATIENT PORTAL LINK FT
You can access the FollowMyHealth Patient Portal offered by [facility] by registering at the following website: [email or web address]. By joining [email or web address]’s FollowMyHealth portal, you will also be able to view your health information using other applications (apps) compatible with our system.

## 2020-08-24 NOTE — ED PROVIDER NOTE - NS ED ROS FT
General: denies fever, chills  HENT: denies nasal congestion, rhinorrhea  Eyes: denies visual changes, blurred vision  CV: denies chest pain, palpitations  Resp: denies difficulty breathing, cough  Abdominal: denies nausea, vomiting, diarrhea, abdominal pain  MSK: right middle/ring finger pain

## 2020-08-24 NOTE — ED PROVIDER NOTE - CARE PROVIDER_API CALL
Sanjeev Mcneill  PLASTIC SURGERY  [address], [address]  [place] [address]  Phone: [phone]  Fax: [phone]  Follow Up Time:

## 2020-08-24 NOTE — ED PROVIDER NOTE - PHYSICAL EXAMINATION
GENERAL: well appearing in no acute distress, non-toxic appearing  HEAD: normocephalic, atraumatic  HEENT: normal conjunctiva, PERRLA, EOMI  CARDIAC: regular rate and rhythm, normal S1S2, no appreciable murmurs  PULM: normal breath sounds, clear to ascultation bilaterally, no rales, rhonchi, wheezing  GI: abdomen nondistended, soft, nontender, no guarding, rebound tenderness  NEURO: no focal motor or sensory deficits, CN2-12 intact, normal speech,   MSK: right third and fourth finger swelling and tender to palpation; minimal flexion at the DIP of both fingers; neurovascularly intact b/l; 2+ radial pulses b/l GENERAL: well appearing in no acute distress, non-toxic appearing  HEAD: normocephalic, atraumatic  HEENT: normal conjunctiva, PERRLA, EOMI  CARDIAC: regular rate and rhythm, normal S1S2, no appreciable murmurs  PULM: normal breath sounds, clear to ascultation bilaterally, no rales, rhonchi, wheezing  GI: abdomen nondistended, soft, nontender, no guarding, rebound tenderness  NEURO: no focal motor or sensory deficits, CN2-12 intact, normal speech,   MSK: right third and fourth finger swelling and tender to palpation; intact flexion at the DIP/ PIP of both fingers with some limitation 2/2 to swelling and pain; neurovascularly intact b/l; 2+ radial pulses b/l

## 2020-08-24 NOTE — ED PROVIDER NOTE - OBJECTIVE STATEMENT
58yo F coming in for finger pain. Patient works as an environmental worker at [employer] and while at work, had a door slam into her right middle and ring finger. Patient felt immediate pain and began bleeding from her ring finger that has ceased stopped. 60yo F coming in for finger pain. 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 Patient felt immediate pain and began bleeding from her ring finger that has since stopped.

## 2020-08-24 NOTE — ED PROVIDER NOTE - ATTENDING CONTRIBUTION TO CARE
Mays DO:. I have personally performed a face to face medical and diagnostic evaluation of the patient. I have discussed with and reviewed the resident's note and agree with the History, ROS, Physical Exam and MDM unless otherwise indicated. A brief summary of my personal evaluation and impression can be found below.    59F w/ injury to right middle and ring finger with noted ecchymosis and edema at volar aspect of distal phalanx of those fingers, with intact sensation and no subungual hematoma. No overlying laceration, small skin tear at volar aspect of right ring finger that is now hemostatic. Right hand neurovascularly intact. Flexion / extension of affected fingers intact. No other injuries. Plan to rule out fx, pain control, ice, reassess. Likely will immobilize with diogo tape until hand follow up. Mays DO:. I have personally performed a face to face medical and diagnostic evaluation of the patient. I have discussed with and reviewed the resident's note and agree with the History, ROS, Physical Exam and MDM unless otherwise indicated. A brief summary of my personal evaluation and impression can be found below.    59F w/ injury to right middle and ring finger with noted ecchymosis and edema at volar aspect of distal phalanx of those fingers, with intact sensation and no subungual hematoma. No overlying laceration, small skin tear at volar aspect of right ring finger that is now hemostatic. Right hand neurovascularly intact. Flexion / extension of affected fingers intact. No other injuries. Plan to rule out fx, pain control, ice, reassess. Likely will immobilize with diogo tape until hand follow up.     Patient speaks adequate english but Moldovan translation provided by Dr Smith

## 2020-08-24 NOTE — ED ADULT NURSE NOTE - NSIMPLEMENTINTERV_GEN_ALL_ED
Implemented All Universal Safety Interventions:  [place]ike to call system. Call bell, personal items and telephone within reach. Instruct patient to call for assistance. Room bathroom lighting operational. Non-slip footwear when patient is off stretcher. Physically safe environment: no spills, clutter or unnecessary equipment. Stretcher in lowest position, wheels locked, appropriate side rails in place.

## 2020-08-24 NOTE — ED PROVIDER NOTE - CLINICAL SUMMARY MEDICAL DECISION MAKING FREE TEXT BOX
58yo F coming in for finger pain after slamming the door on her fingers. Physical is remarkable for edema + tender to palpation w/ minimal ability to flex at DIP. Plan: r/o fracture of DIP + pain control 60yo F coming in for finger pain after slamming the door on her fingers. Physical is remarkable for edema + tender to palpation.  Plan: r/o fracture of DIP + pain control

## 2020-08-24 NOTE — ED ADULT NURSE NOTE - OBJECTIVE STATEMENT
60 yo female complaining of finger pain after a door closed on her right middle and ring finger " I feel pain, I have not lost the sensation on my fingers yet" pt alerted and oriented x3, fingers with bruising and redness. Pt stable, no signs of acute distress noted, will continue to monitor closely. Cold compresses applied, MD at the bedside, will continue to monitor closely.

## 2020-08-24 NOTE — ED PROVIDER NOTE - CARE PLAN
Principal Discharge DX:	Fracture Principal Discharge DX:	Closed nondisplaced fracture of distal phalanx of right ring finger, initial encounter

## 2020-08-27 ENCOUNTER — APPOINTMENT (OUTPATIENT)
Dept: ORTHOPEDIC SURGERY | Facility: CLINIC | Age: 60
End: 2020-08-27
Payer: OTHER MISCELLANEOUS

## 2020-08-27 VITALS — WEIGHT: 165 LBS | HEIGHT: 60 IN | BODY MASS INDEX: 32.39 KG/M2

## 2020-08-27 PROCEDURE — 99203 OFFICE O/P NEW LOW 30 MIN: CPT

## 2020-08-27 NOTE — PHYSICAL EXAM
[de-identified] : Patient is WDWN, alert, and in no acute distress. Breathing is unlabored. She is grossly oriented to person, place, and time. \par Ecchymosis and edema in long and ring finger tips.Limited flexion secondary to pain [de-identified] : Xray of the right hand obtained at [facility] on 08/24/2020 were reviewed and interpreted in office today 08/27/2020 by Dr. Moise and revealed acute transverse nondisplaced fracture of the tuft of the distal phalanx of the ring finger. No other fractures. No advanced arthritic changes. Signed by GERTRUDIS ALONSO M.D., RADIOLOGY RESIDENT.

## 2020-08-27 NOTE — DISCUSSION/SUMMARY
[de-identified] : The underlying pathophysiology was reviewed with the patient. Treatment options were discussed including; observation, NSAIDS, analgesics, bracing, injection(s), physical therapy\par \par Patient was informed that she does not need the brace at this time. \par Range of motion and strengthening exercises were reviewed. \par A note for work was provided. \par Follow Up in 2 weeks.

## 2020-08-27 NOTE — HISTORY OF PRESENT ILLNESS
[de-identified] : Pt is a 68 y/o RHD female with right ring finger fracture.  She shut her finger in the door while working at [facility] [employer] on Monday 8/24/20.  She had pain and ecchymosis immediately.  She went to the ED there where xrays were taken.  She was told that she fractured the right ring finger.  An aluminum splint was applied and the finger was diogo taped to the right long finger.  She continues to have pain and swelling in the finger.  \par She also has pain, swelling and ecchymosis in the right long finger.  Xray did not report a fracture in this finger.

## 2020-08-27 NOTE — ADDENDUM
[FreeTextEntry1] : I, Lori Naranjo wrote this note acting as a scribe for Dr. Franko Moise on Aug 27, 2020.

## 2020-08-27 NOTE — END OF VISIT
[FreeTextEntry3] : I, Franko Moise MD, ordering physician, have read and attest that all the information, medical decision making and discharge instructions within are true and accurate.\par

## 2020-08-27 NOTE — RETURN TO WORK/SCHOOL
[FreeTextEntry1] : Ms. NORMAN GORE was seen in the office today on 08/27/2020 and evaluated by me for an Orthopedic visit. Please be advised that she will be unable to work until her next evaluation in two weeks. \par \par Sincerely, \ash Moise MD

## 2020-09-10 ENCOUNTER — APPOINTMENT (OUTPATIENT)
Dept: ORTHOPEDIC SURGERY | Facility: CLINIC | Age: 60
End: 2020-09-10
Payer: OTHER MISCELLANEOUS

## 2020-09-10 PROCEDURE — 73140 X-RAY EXAM OF FINGER(S): CPT | Mod: F8

## 2020-09-10 PROCEDURE — 99213 OFFICE O/P EST LOW 20 MIN: CPT

## 2020-09-10 NOTE — PHYSICAL EXAM
[de-identified] : Patient is WDWN, alert, and in no acute distress. Breathing is unlabored. She is grossly oriented to person, place, and time.\par \par Right hand:\par ring finger :small subungual hematoma, mild tenderness over distal phalanx, DIP 0-20\par Sensation is normal.\par \par  [de-identified] : Xray of the right hand obtained at [facility] on 08/24/2020 were reviewed and interpreted in office today 08/27/2020 by Dr. Moise and revealed acute transverse nondisplaced fracture of the tuft of the distal phalanx of the ring finger. No other fractures. No advanced arthritic changes. Signed by GERTRUDIS ALONSO M.D., RADIOLOGY RESIDENT.\par \par AP, lateral and oblique views of the right ring finger were obtained today and revealed nondisplaced transverse fracture of the tuft of the ring finger distal  phalanx.

## 2020-09-10 NOTE — END OF VISIT
[FreeTextEntry3] : I, Franko Moise MD, ordering physician, have read and attest that all the information, medical decision making and discharge instructions within are true and accurate.

## 2020-09-10 NOTE — RETURN TO WORK/SCHOOL
[FreeTextEntry1] : Ms. NORMAN GORE was seen in the office today on 08/27/2020 and evaluated by me for an Orthopedic visit. Please be advised that she will be unable to work until her next evaluation in four weeks. \par \par Sincerely, \ash Moise MD

## 2020-09-10 NOTE — DISCUSSION/SUMMARY
[de-identified] : The underlying pathophysiology was reviewed with the patient. Treatment options were discussed including; observation, NSAIDS, analgesics, bracing, injection(s), physical therapy\par \par Patient was informed that she does not need the brace at this time. \par Range of motion and strengthening exercises were reviewed. \par A note for work was provided. \par Follow Up in 4 weeks.

## 2020-09-10 NOTE — HISTORY OF PRESENT ILLNESS
[de-identified] : Pt is a 68 y/o RHD female with right ring finger fracture.  She shut her finger in the door while working at [employer] [facility] [employer] on Monday 8/24/20.  She had pain and ecchymosis immediately.  She went to the ED there where xrays were taken.  She was told that she fractured the right ring finger.  An aluminum splint was applied and the finger was diogo taped to the right long finger.  She continues to have pain and swelling in the finger.  \par She also has pain, swelling and ecchymosis in the right long finger.  Xray did not report a fracture in this finger.\par \par Patient returns today stating that she is still experiencing pain in her right ring finger.

## 2020-09-10 NOTE — ADDENDUM
[FreeTextEntry1] : I, Lori Naranjo wrote this note acting as a scribe for Dr. Franko Moise on Sep 10, 2020.

## 2020-10-08 ENCOUNTER — APPOINTMENT (OUTPATIENT)
Dept: ORTHOPEDIC SURGERY | Facility: CLINIC | Age: 60
End: 2020-10-08
Payer: OTHER MISCELLANEOUS

## 2020-10-08 PROCEDURE — 73140 X-RAY EXAM OF FINGER(S): CPT | Mod: F5

## 2020-10-08 PROCEDURE — 99213 OFFICE O/P EST LOW 20 MIN: CPT

## 2020-10-08 NOTE — RETURN TO WORK/SCHOOL
[FreeTextEntry1] : Ms. NORMAN GORE was seen in the office today on 10/08/2020 and evaluated by me for an Orthopedic visit. Please be advised that she will unable to work until 10/29/2020 due to her fracture of the distal phalanx of the 4th finger. \par \par Sincerely, \par Franko Moise MD

## 2020-10-08 NOTE — HISTORY OF PRESENT ILLNESS
[de-identified] : Pt is a 70 y/o RHD female with right ring finger fracture.  She shut her finger in the door while working at [facility] [employer] on Monday 8/24/20.  She had pain and ecchymosis immediately.  She went to the ED there where xrays were taken.  She was told that she fractured the right ring finger.  An aluminum splint was applied and the finger was diogo taped to the right long finger.  She continues to have pain and swelling in the finger.  \par She also has pain, swelling and ecchymosis in the right long finger.  Xray did not report a fracture in this finger.\par \par Patient returned stating that she is still experiencing pain in her right ring finger. She returns stating that she is still experiencing pain . She states that she takes Tylenol when needed.

## 2020-10-08 NOTE — DISCUSSION/SUMMARY
[de-identified] : The underlying pathophysiology was reviewed with the patient. Treatment options were discussed including; observation, NSAIDS, analgesics, bracing, injection(s), physical therapy\par \par The patient wishes to proceed with hand therapy. A script was given.   \par The patient was advised to soak the hand in warm water and Epsom salt. \par Range of motion and strengthening exercises were reviewed. \par A note for work was provided. \par Follow Up in 4 weeks.

## 2020-10-08 NOTE — PHYSICAL EXAM
[de-identified] : Patient is WDWN, alert, and in no acute distress. Breathing is unlabored. She is grossly oriented to person, place, and time.\par \par Right hand:\par ring finger :small subungual hematoma, mild tenderness over distal phalanx, DIP 0-20\par Sensation is normal.\par \par  [de-identified] : Xray of the right hand obtained at [facility] on 08/24/2020 were reviewed and interpreted in office today 08/27/2020 by Dr. Moise and revealed acute transverse nondisplaced fracture of the tuft of the distal phalanx of the ring finger. No other fractures. No advanced arthritic changes. Signed by GERTRUDIS ALONSO M.D., RADIOLOGY RESIDENT.\par \par AP, lateral and oblique views of the right ring finger were obtained today and revealed nondisplaced transverse fracture of the tuft of the ring finger distal  phalanx. Fracture line is still evident, however there is healing.

## 2020-10-26 ENCOUNTER — APPOINTMENT (OUTPATIENT)
Dept: ORTHOPEDIC SURGERY | Facility: CLINIC | Age: 60
End: 2020-10-26
Payer: OTHER MISCELLANEOUS

## 2020-10-26 DIAGNOSIS — S62.664D NONDISPLACED FRACTURE OF DISTAL PHALANX OF RIGHT RING FINGER, SUBSEQUENT ENCOUNTER FOR FRACTURE WITH ROUTINE HEALING: ICD-10-CM

## 2020-10-26 DIAGNOSIS — S62.664A NONDISPLACED FRACTURE OF DISTAL PHALANX OF RIGHT RING FINGER, INITIAL ENCOUNTER FOR CLOSED FRACTURE: ICD-10-CM

## 2020-10-26 PROCEDURE — 73140 X-RAY EXAM OF FINGER(S): CPT | Mod: F8

## 2020-10-26 PROCEDURE — 99214 OFFICE O/P EST MOD 30 MIN: CPT

## 2020-10-26 PROCEDURE — 99072 ADDL SUPL MATRL&STAF TM PHE: CPT

## 2020-10-26 NOTE — PHYSICAL EXAM
[de-identified] : Patient is WDWN, alert, and in no acute distress. Breathing is unlabored. She is grossly oriented to person, place, and time.\par \par Right hand:\par ring finger :subungual hematoma is resolved, mild tenderness over distal phalanx, DIP 0-30\par FROM\par Sensation is normal.\par \par  [de-identified] : \par \par AP, lateral and oblique views of the right ring finger were obtained and revealed nondisplaced transverse fracture of the tuft of the ring finger distal  phalanx. Fracture line is still evident, however there is healing. Fracture is well aligned. There is evidence of arthritis in the little finger.

## 2020-10-26 NOTE — DISCUSSION/SUMMARY
[de-identified] : \par   \par The patient was advised to soak the hand in warm water and Epsom salt. \par Range of motion and strengthening exercises were reviewed. \par A note for work was provided. \par Follow Up in 4 weeks.

## 2020-10-26 NOTE — ADDENDUM
[FreeTextEntry1] : I, Lori Naranjo wrote this note acting as a scribe for Dr. Franko Moise on Oct 26, 2020.

## 2020-10-26 NOTE — HISTORY OF PRESENT ILLNESS
[de-identified] : Pt is a 70 y/o RHD female with right ring finger fracture.  She shut her finger in the door while working at [facility] [employer] on Monday 8/24/20.  She had pain and ecchymosis immediately.  She went to the ED there where xrays were taken.  She was told that she fractured the right ring finger.  An aluminum splint was applied and the finger was diogo taped to the right long finger.  She continues to have pain and swelling in the finger.  \par She also has pain, swelling and ecchymosis in the right long finger.  Xray did not report a fracture in this finger.\par \par Patient returned stating that she is still experiencing pain and swelling in proximal right 3rd and 4th fingers. She has limited flexing, but has not started PT as of yet - wearing a finger splint prn. Reports using epson salt baths. She states that she takes Tylenol when needed. She returns stating that she still has pain in her hand.

## 2020-10-26 NOTE — RETURN TO WORK/SCHOOL
[FreeTextEntry1] : Ms. NORMAN GORE was seen in the office today on 10/26/2020 and evaluated by me for an Orthopedic visit. Please be advised that she will unable to work until 11/12/2020 due to her fracture of the distal phalanx of the 4th finger. \par \par Sincerely, \par Franko Moise MD

## 2021-01-21 NOTE — ED ADULT TRIAGE NOTE - MEANS OF ARRIVAL
[facility] EMERGENCY [facility] Case Management Initial Discharge Assessment    Met with Patient and Family WIFE FERNANDO to discuss discharge plan. PCP: Jeffrey Carrillo, DO           Date of Last Visit: AUGUST 2020    If no PCP, list provided? N/A    Discharge Planning    Living Arrangements: independently at home    Who do you live with? WIFE    Who helps you with your care:  self    If lives at home:     Do you have any barriers navigating in your home? no    Patient can perform ADL? Yes    Current Services (outpatient and in home) :  None    Dialysis: No    Is transportation available to get to your appointments? Yes    DME Equipment:  yes - WALKER WHEELED    Respiratory equipment: None    Respiratory provider:  no     Pharmacy:  yes - [facility] [address] with Medication Assistance Program?  No      Patient agreeable to [facility]? Declined    Patient agreeable to SNF/Rehab? N/A    Other discharge needs identified? N/A    Freedom of choice list provided with basic dialogue that supports the patient's individualized plan of care/goals and shares the quality data associated with the providers. Yes    Does Patient Have a High-Risk for Readmission Diagnosis (CHF, PN, MI, COPD)? Yes, see care coordinator assessment, COPD       The plan for Transition of Care is related to the following treatment goals:   [address]    Initial Discharge Plan? (Note: please see concurrent daily documentation for any updates after initial note). HOME WITH WIFE    The Patient and/or patient representative: PT was provided with choice of any post-acute providers for care and equipment and agrees with discharge plan  Yes    Electronically signed by Claudene Dart. Holowecky, RN on 1/21/2021 at 1:59 PM ambulatory

## 2021-02-26 ENCOUNTER — APPOINTMENT (OUTPATIENT)
Dept: ORTHOPEDIC SURGERY | Facility: CLINIC | Age: 61
End: 2021-02-26
Payer: OTHER MISCELLANEOUS

## 2021-02-26 VITALS — WEIGHT: 165 LBS | BODY MASS INDEX: 32.39 KG/M2 | HEIGHT: 60 IN

## 2021-02-26 DIAGNOSIS — S93.409A SPRAIN OF UNSPECIFIED LIGAMENT OF UNSPECIFIED ANKLE, INITIAL ENCOUNTER: ICD-10-CM

## 2021-02-26 PROCEDURE — 73610 X-RAY EXAM OF ANKLE: CPT | Mod: LT

## 2021-02-26 PROCEDURE — 99072 ADDL SUPL MATRL&STAF TM PHE: CPT

## 2021-02-26 PROCEDURE — 99204 OFFICE O/P NEW MOD 45 MIN: CPT

## 2021-03-23 ENCOUNTER — APPOINTMENT (OUTPATIENT)
Dept: ORTHOPEDIC SURGERY | Facility: CLINIC | Age: 61
End: 2021-03-23
Payer: OTHER MISCELLANEOUS

## 2021-03-23 DIAGNOSIS — S99.912A UNSPECIFIED INJURY OF LEFT ANKLE, INITIAL ENCOUNTER: ICD-10-CM

## 2021-03-23 PROCEDURE — 99214 OFFICE O/P EST MOD 30 MIN: CPT

## 2021-03-23 PROCEDURE — 99072 ADDL SUPL MATRL&STAF TM PHE: CPT

## 2021-03-24 ENCOUNTER — FORM ENCOUNTER (OUTPATIENT)
Age: 61
End: 2021-03-24

## 2021-07-01 ENCOUNTER — APPOINTMENT (OUTPATIENT)
Dept: ORTHOPEDIC SURGERY | Facility: CLINIC | Age: 61
End: 2021-07-01
Payer: OTHER MISCELLANEOUS

## 2021-07-01 VITALS
BODY MASS INDEX: 32.39 KG/M2 | SYSTOLIC BLOOD PRESSURE: 116 MMHG | HEART RATE: 76 BPM | WEIGHT: 165 LBS | DIASTOLIC BLOOD PRESSURE: 67 MMHG | HEIGHT: 60 IN

## 2021-07-01 DIAGNOSIS — G89.29 LUMBAGO WITH SCIATICA, LEFT SIDE: ICD-10-CM

## 2021-07-01 DIAGNOSIS — M54.42 LUMBAGO WITH SCIATICA, LEFT SIDE: ICD-10-CM

## 2021-07-01 DIAGNOSIS — M54.41 LUMBAGO WITH SCIATICA, LEFT SIDE: ICD-10-CM

## 2021-07-01 PROCEDURE — 99072 ADDL SUPL MATRL&STAF TM PHE: CPT

## 2021-07-01 PROCEDURE — 99214 OFFICE O/P EST MOD 30 MIN: CPT

## 2021-07-01 PROCEDURE — 72100 X-RAY EXAM L-S SPINE 2/3 VWS: CPT

## 2021-07-01 NOTE — PHYSICAL EXAM
[de-identified] : Constitutional: Well-nourished, well-developed, No acute distress\par Respiratory:  Good respiratory effort, no SOB\par Lymphatic: No regional lymphadenopathy, no lymphedema\par Psychiatric: Pleasant and normal affect, alert and oriented x3\par Musculoskeletal: normal except where as noted in regional exam\par \par Lumbar Spine Exam\par \par APPEARANCE: no marked deformities or malalignment, normal curvature of the lumbosacral spine. good posture\par POSITIVE TENDERNESS: + Bilateral lumbar tenderness and spasm noted in erector spinae and quadratus lumborum\par NONTENDER: no bony midline tenderness\par ROM: full, although painful and moderately limited at end range of flexion, mild pain at end range of extension\par RESISTIVE TESTING: Mildly painful 5/5 resisted flex/ext, sidebending b/l, and rotation\par SPECIAL TESTS: neg SLR b/l, neg HERNANDO b/l, neg Trendelenburg b/l \par PULSES: 2+ DP/PT pulses\par NEURO:  L1 - S2 intact to sensation and motor, DTRs 2+/4 patella and achilles\par  [de-identified] : The following radiographs were ordered and read by me during this patient's visit. I reviewed each radiograph in detail with the patient and discussed the findings as highlighted below. \par \par 2 views of the lumbar spine were obtained today that show degenerative changes and evidence of mild multilevel osteoarthritis with sacralization of L5.  No fracture, or dislocation seen at this time. There is no malalignment. No other obvious osseous abnormality. Otherwise unremarkable.

## 2021-07-01 NOTE — DISCUSSION/SUMMARY
[de-identified] : Patient is being seen today for initial evaluation and management of chronic low back pain from a reported Worker's Compensation injury which occurred in September 2019.  Patient reported that she was lifting a garbage bag and felt a pop in her back, and has been having chronic low back pain since that time.  Patient is now having new symptoms of intermittent bilateral lower extremity radiculopathy without any further injury or identifiable exacerbation.  Patient does not provide any explanation as to why she has not sought medical evaluation for this issue since 2019.  On x-ray today patient has mild multilevel osteoarthritis, on clinical exam she has no identifiable sensory deficits or significant motor weakness, but has localized low back pain secondary to underlying osteoarthritis.  At this time recommend a course of conservative management.  Patient will be started on a course of physical therapy to restore normal range of motion and strength as tolerated.  Patient started on a course of oral NSAIDs, prescription given for Mobic (We discussed all possible side effects of this medication).  If patient has persisting pain, particularly if she has persisting radiculopathy MRI would be indicated at that time; however I do not feel comfortable ordering MRI under this Worker's Compensation claim as there is no identifiable causality or relationship between lifting a bag of garbage in 2019 and development of lumbar radicular pain in 2021.  Patient has pre-existing osteoarthritis, her reported injury from 2019 has not prevented her from being able to work, I do not see any causality here that would justify ordering further imaging or further care to be covered under this Worker's Compensation claim.  Patient can follow-up in 1-2 months for reevaluation.  Patient appreciates and agrees with current plan.\par \par This note was generated using dragon medical dictation software.  A reasonable effort has been made for proofreading its contents, but typos may still remain.  If there are any questions or points of clarification needed please notify my office.

## 2021-07-05 ENCOUNTER — FORM ENCOUNTER (OUTPATIENT)
Age: 61
End: 2021-07-05

## 2021-07-19 ENCOUNTER — FORM ENCOUNTER (OUTPATIENT)
Age: 61
End: 2021-07-19

## 2021-07-22 ENCOUNTER — NON-APPOINTMENT (OUTPATIENT)
Age: 61
End: 2021-07-22

## 2021-10-13 ENCOUNTER — RX RENEWAL (OUTPATIENT)
Age: 61
End: 2021-10-13

## 2021-11-16 ENCOUNTER — EMERGENCY (EMERGENCY)
Facility: HOSPITAL | Age: 61
LOS: 1 days | Discharge: HOME | End: 2021-11-16
Attending: EMERGENCY MEDICINE
Payer: COMMERCIAL

## 2021-11-16 VITALS
WEIGHT: 169.98 LBS | SYSTOLIC BLOOD PRESSURE: 174 MMHG | HEART RATE: 93 BPM | HEIGHT: 60 IN | RESPIRATION RATE: 18 BRPM | DIASTOLIC BLOOD PRESSURE: 96 MMHG | OXYGEN SATURATION: 97 % | TEMPERATURE: 98 F

## 2021-11-16 LAB
ALBUMIN SERPL ELPH-MCNC: 4.3 G/DL — SIGNIFICANT CHANGE UP (ref 3.3–5)
ALP SERPL-CCNC: 88 U/L — SIGNIFICANT CHANGE UP (ref 40–120)
ALT FLD-CCNC: 42 U/L — SIGNIFICANT CHANGE UP (ref 10–45)
ANION GAP SERPL CALC-SCNC: 11 MMOL/L — SIGNIFICANT CHANGE UP (ref 5–17)
APTT BLD: 37 SEC — HIGH (ref 27.5–35.5)
AST SERPL-CCNC: 52 U/L — HIGH (ref 10–40)
BASOPHILS # BLD AUTO: 0.04 K/UL — SIGNIFICANT CHANGE UP (ref 0–0.2)
BASOPHILS NFR BLD AUTO: 0.3 % — SIGNIFICANT CHANGE UP (ref 0–2)
BILIRUB SERPL-MCNC: 0.3 MG/DL — SIGNIFICANT CHANGE UP (ref 0.2–1.2)
BLD GP AB SCN SERPL QL: NEGATIVE — SIGNIFICANT CHANGE UP
BUN SERPL-MCNC: 18 MG/DL — SIGNIFICANT CHANGE UP (ref 7–23)
CALCIUM SERPL-MCNC: 9.4 MG/DL — SIGNIFICANT CHANGE UP (ref 8.4–10.5)
CHLORIDE SERPL-SCNC: 102 MMOL/L — SIGNIFICANT CHANGE UP (ref 96–108)
CO2 SERPL-SCNC: 24 MMOL/L — SIGNIFICANT CHANGE UP (ref 22–31)
CREAT SERPL-MCNC: 0.56 MG/DL — SIGNIFICANT CHANGE UP (ref 0.5–1.3)
EOSINOPHIL # BLD AUTO: 0.1 K/UL — SIGNIFICANT CHANGE UP (ref 0–0.5)
EOSINOPHIL NFR BLD AUTO: 0.8 % — SIGNIFICANT CHANGE UP (ref 0–6)
GLUCOSE SERPL-MCNC: 159 MG/DL — HIGH (ref 70–99)
HCT VFR BLD CALC: 41.7 % — SIGNIFICANT CHANGE UP (ref 34.5–45)
HGB BLD-MCNC: 13.3 G/DL — SIGNIFICANT CHANGE UP (ref 11.5–15.5)
IMM GRANULOCYTES NFR BLD AUTO: 0.5 % — SIGNIFICANT CHANGE UP (ref 0–1.5)
INR BLD: 1.1 RATIO — SIGNIFICANT CHANGE UP (ref 0.88–1.16)
LYMPHOCYTES # BLD AUTO: 19 % — SIGNIFICANT CHANGE UP (ref 13–44)
LYMPHOCYTES # BLD AUTO: 2.4 K/UL — SIGNIFICANT CHANGE UP (ref 1–3.3)
MCHC RBC-ENTMCNC: 29.7 PG — SIGNIFICANT CHANGE UP (ref 27–34)
MCHC RBC-ENTMCNC: 31.9 GM/DL — LOW (ref 32–36)
MCV RBC AUTO: 93.1 FL — SIGNIFICANT CHANGE UP (ref 80–100)
MONOCYTES # BLD AUTO: 0.65 K/UL — SIGNIFICANT CHANGE UP (ref 0–0.9)
MONOCYTES NFR BLD AUTO: 5.2 % — SIGNIFICANT CHANGE UP (ref 2–14)
NEUTROPHILS # BLD AUTO: 9.35 K/UL — HIGH (ref 1.8–7.4)
NEUTROPHILS NFR BLD AUTO: 74.2 % — SIGNIFICANT CHANGE UP (ref 43–77)
NRBC # BLD: 0 /100 WBCS — SIGNIFICANT CHANGE UP (ref 0–0)
PLATELET # BLD AUTO: 163 K/UL — SIGNIFICANT CHANGE UP (ref 150–400)
POTASSIUM SERPL-MCNC: 4.1 MMOL/L — SIGNIFICANT CHANGE UP (ref 3.5–5.3)
POTASSIUM SERPL-SCNC: 4.1 MMOL/L — SIGNIFICANT CHANGE UP (ref 3.5–5.3)
PROT SERPL-MCNC: 7.4 G/DL — SIGNIFICANT CHANGE UP (ref 6–8.3)
PROTHROM AB SERPL-ACNC: 13.1 SEC — SIGNIFICANT CHANGE UP (ref 10.6–13.6)
RBC # BLD: 4.48 M/UL — SIGNIFICANT CHANGE UP (ref 3.8–5.2)
RBC # FLD: 12.2 % — SIGNIFICANT CHANGE UP (ref 10.3–14.5)
RH IG SCN BLD-IMP: POSITIVE — SIGNIFICANT CHANGE UP
SODIUM SERPL-SCNC: 137 MMOL/L — SIGNIFICANT CHANGE UP (ref 135–145)
WBC # BLD: 12.6 K/UL — HIGH (ref 3.8–10.5)
WBC # FLD AUTO: 12.6 K/UL — HIGH (ref 3.8–10.5)

## 2021-11-16 PROCEDURE — 76377 3D RENDER W/INTRP POSTPROCES: CPT | Mod: 26

## 2021-11-16 PROCEDURE — 72125 CT NECK SPINE W/O DYE: CPT | Mod: 26,MA

## 2021-11-16 PROCEDURE — 70450 CT HEAD/BRAIN W/O DYE: CPT | Mod: 26,MA

## 2021-11-16 PROCEDURE — 70486 CT MAXILLOFACIAL W/O DYE: CPT | Mod: 26,MA

## 2021-11-16 PROCEDURE — 93010 ELECTROCARDIOGRAM REPORT: CPT | Mod: NC

## 2021-11-16 RX ORDER — ACETAMINOPHEN 500 MG
975 TABLET ORAL ONCE
Refills: 0 | Status: COMPLETED | OUTPATIENT
Start: 2021-11-16 | End: 2021-11-16

## 2021-11-16 RX ORDER — TETANUS TOXOID, REDUCED DIPHTHERIA TOXOID AND ACELLULAR PERTUSSIS VACCINE, ADSORBED 5; 2.5; 8; 8; 2.5 [IU]/.5ML; [IU]/.5ML; UG/.5ML; UG/.5ML; UG/.5ML
0.5 SUSPENSION INTRAMUSCULAR ONCE
Refills: 0 | Status: COMPLETED | OUTPATIENT
Start: 2021-11-16 | End: 2021-11-16

## 2021-11-16 RX ADMIN — Medication 975 MILLIGRAM(S): at 20:31

## 2021-11-16 RX ADMIN — TETANUS TOXOID, REDUCED DIPHTHERIA TOXOID AND ACELLULAR PERTUSSIS VACCINE, ADSORBED 0.5 MILLILITER(S): 5; 2.5; 8; 8; 2.5 SUSPENSION INTRAMUSCULAR at 20:31

## 2021-11-16 NOTE — CONSULT NOTE ADULT - SUBJECTIVE AND OBJECTIVE BOX
p (1480)     HPI:    60F hx UC/HTN s/p fall while walking dog. CTH shows small interhemispheric and minimal L frontal subdural hematoma with R temporal trace tSAH. Exam: AAOx3, EOMI, facial bruising, no drift, ROSE 5/5 except L knee pain limited.   --Anticoagulation:    =====================  PAST MEDICAL HISTORY   HTN (hypertension)    Colitis    Ulcerative colitis      PAST SURGICAL HISTORY   No significant past surgical history          MEDICATIONS:  Antibiotics:    Neuro:    Other:      SOCIAL HISTORY:   Occupation:   Marital Status:     FAMILY HISTORY:  Family history of hypertension in father (Father)        ROS: Negative except per HPI    LABS:  PT/INR - ( 16 Nov 2021 22:34 )   PT: 13.1 sec;   INR: 1.10 ratio         PTT - ( 16 Nov 2021 22:34 )  PTT:37.0 sec                        13.3   12.60 )-----------( 163      ( 16 Nov 2021 22:34 )             41.7     11-16    137  |  102  |  18  ----------------------------<  159<H>  4.1   |  24  |  0.56    Ca    9.4      16 Nov 2021 22:34    TPro  7.4  /  Alb  4.3  /  TBili  0.3  /  DBili  x   /  AST  52<H>  /  ALT  42  /  AlkPhos  88  11-16

## 2021-11-16 NOTE — ED ADULT NURSE NOTE - NSICDXFAMILYHX_GEN_ALL_CORE_FT
2
FAMILY HISTORY:  Father  Still living? Unknown  Family history of hypertension in father, Age at diagnosis: Age Unknown

## 2021-11-16 NOTE — ED PROVIDER NOTE - NS ED ROS FT
Constitutional:  (-) fever, (-) chills, (-) lethargy  Eyes:  (-) eye pain (-) visual changes  ENMT: (+) nose swelling, laceration (-) nasal discharge, (-) sore throat. (+) neck pain or stiffness  Cardiac: (-) chest pain (-) palpitations  Respiratory:  (-) cough (-) respiratory distress.   GI:  (-) nausea (-) vomiting (-) diarrhea (-) abdominal pain.  :  (-) dysuria (-) frequency (-) burning.  MS:  (-) back pain (+) joint pain.  Neuro:  (+) headache (-) numbness (-) tingling (-) focal weakness  Skin:  (-) rash  Except as documented in the HPI,  all other systems are negative

## 2021-11-16 NOTE — ED PROVIDER NOTE - CARE PLAN
1 Principal Discharge DX:	Subdural hemorrhage   Principal Discharge DX:	Subdural hemorrhage  Secondary Diagnosis:	H/O subarachnoid hemorrhage

## 2021-11-16 NOTE — ED PROVIDER NOTE - OBJECTIVE STATEMENT
60 y F, hx of IBD, HTN, presenting with multiple facial injuries and L knee pain after a mechanical fall earlier today. Patient was walking with her dog which suddenly sprinted to jhon another dog, yanking the leash and patient fell forward on her face. Injuries include swelling and laceration of nose, laceration of forehead, abrasion of chin, L knee pain. +LOC for a "few minutes", reports headache, dizziness, not on thinner, able to ambulate after the incident. Denies any other acute symptoms. 60 y F, hx of IBD, HTN, presenting with multiple facial injuries and L knee pain after a mechanical fall earlier today. Patient was walking with her dog which suddenly sprinted to jhon another dog, yanking the leash and patient fell forward on her face. Injuries include swelling and laceration of nose, laceration of forehead, abrasion of chin, L knee pain. +LOC for a "few minutes", reports headache, dizziness, not on thinner, able to ambulate after the incident. Denies any other acute symptoms. Does not remember last time patient got tetanus.

## 2021-11-16 NOTE — ED PROVIDER NOTE - CLINICAL SUMMARY MEDICAL DECISION MAKING FREE TEXT BOX
60 y F, hx of IBD, HTN, presenting with multiple facial injuries and L knee pain after a mechanical fall earlier today. Injuries include swelling and laceration of nose, laceration of forehead, abrasion of chin, L knee pain. +LOC for a "few minutes", reports headache, dizziness, not on thinner, able to ambulate after the incident 60 y F, hx of IBD, HTN, presenting with multiple facial injuries and L knee pain after a mechanical fall earlier today. Injuries include swelling and laceration of nose, laceration of forehead, abrasion of chin, L knee pain. +LOC for a "few minutes", reports headache, dizziness, not on thinner, able to ambulate after the incident. /96, VSWNL. 1 cm forehead laceration, 2-3 cm nasal laceration with swelling, chin abrasions, bilateral knee abrasions. Will get CTH, CTCSpn, CTMF, manage pain with Tylenol. Update tetanus.

## 2021-11-16 NOTE — ED PROVIDER NOTE - NSICDXFAMILYHX_GEN_ALL_CORE_FT
FAMILY HISTORY:  Father  Still living? Unknown  Family history of hypertension in father, Age at diagnosis: Age Unknown

## 2021-11-16 NOTE — ED PROVIDER NOTE - PROGRESS NOTE DETAILS
Attending MD Rabago: Spoke with Dr. Triana of radiology, no acute fracture seen on L knee, will place in obs for 6hr CT, neurochecks and PT in AM.

## 2021-11-16 NOTE — ED ADULT NURSE NOTE - NSFALLRSKPASTHIST_ED_ALL_ED
1015 Discharge in stable condition / wheelchair accompanied by spouse.Band aids x4 dry and intact. Rates pain level on discharge 2/10. No c/o's offered.  
yes

## 2021-11-16 NOTE — ED ADULT NURSE NOTE - OBJECTIVE STATEMENT
61 y/o female with PMH HLD BIBEMS presenting to ED for fall. pt reports she was walking her dog with lease around her left arm wrapped tightly, dog saw another dog, got excited and ran, pulling pt to the ground. pt fell forward onto knees and face. Upon exam pt aox4 breathing even unlabored spontaneously, swelling laceration ecchymosis to right forehead, nose, and chin, abrasions noted to knees. Pt denies LOC/ dizziness/ blurry vision. pt endorses mild h/a at this time. Pt ambulated independently to toilet.

## 2021-11-16 NOTE — ED PROVIDER NOTE - PHYSICAL EXAMINATION
Gen: Patient is well-appearing, NAD, AAOx3  HEENT: subcentimer laceration on forehead, about 2-3 cm laceration on nose which is swollen, EOMI, PEERLA, normal conjunctiva, tongue midline, oral mucosa moist with some bleeding, abrasion on chin.  Lung: CTAB, no respiratory distress, no wheezes/rhonchi/rales B/L, speaking in full sentences  CV: RRR, no murmurs, rubs or gallops, distal pulses 2+ b/l  Abd: soft, NT, ND, no guarding, no rigidity, no rebound tenderness  MSK: no visible deformities, ROM normal in UE/LE, no back TTP  Neuro: No focal sensory or motor deficits  Skin: Warm, well perfused, no leg swelling  Psych: normal affect, calm Gen: Patient is well-appearing, NAD, AAOx3  HEENT: subcentimer laceration on forehead, about 2-3 cm laceration on nose which is swollen, EOMI, PEERLA, normal conjunctiva, tongue midline, oral mucosa moist with some bleeding, abrasion on chin.  Lung: CTAB, no respiratory distress, no wheezes/rhonchi/rales B/L, speaking in full sentences  CV: RRR, no murmurs, rubs or gallops, distal pulses 2+ b/l  Abd: soft, NT, ND, no guarding, no rigidity, no rebound tenderness  MSK: no visible deformities, minor abrasions on bilateral knees, ROM normal in UE/LE, no back TTP  Neuro: No focal sensory or motor deficits  Skin: Warm, well perfused, no leg swelling  Psych: normal affect, calm

## 2021-11-16 NOTE — ED PROVIDER NOTE - ATTENDING CONTRIBUTION TO CARE
Attending MD Rabago: I personally have seen and examined this patient.  Resident note reviewed and agree on plan of care and except where noted.  See below for details.     Seen in [facility]    60F with PMH/PSH including HTN on Losartan, Ulcerative Colitis on Entivio q2mo, denies sx, denies allergies presents to the ED with headache, abrasions to face and L knee pain s/p falling and being dragged by her dog.  Reports at around 7pm has a [place] Oakes who she was walking and then dog saw another dog, yanked her causing her to fall forward onto her face and reports was dragged by dog.  Reports now has headache. Denies change in vision, double vision, sudden loss of vision, change in hearing, tinnitus. Denies numbness, weakness or tingling in extremities. Denies loss of urinary or bowel continence. Denies chest pain, shortness of breath, palpitations. Denies abdominal pain, nausea, vomiting, diarrhea.  Denies fevers, chils.  LMP 15 yrs ago. A ten (10) point review of systems was negative other than as stated in the HPI or elsewhere in the chart. Tetanus unknown.    Exam:   General: NAD, AAOx3  HENT: head with abrasion overlying hematoma to the R forehead, abrasions to chin, +2.5 cm laceration vertically on nose, no nasal septal hematoma, no tenderness to bony bridge of nose, no dried blood on narea, no blood in oropharynx, airway patent with moist mucous membranes  Eyes: PERRL, EOMI  Lungs: lungs CTAB with good inspiratory effort, no wheezing, no rhonchi, no rales  Cardiac: +S1S2, no m/r/g  GI: abdomen soft with +BS, NT, ND  : no CVAT  MSK: FROM at neck, no tenderness to midline palpation, no stepoffs along length of spine, +tenderness to palpation of L knee, limitation of range of motion secondary to pain, +ecchymosis and abrasion  Neuro: CN 2-12 grossly intact, moving all extremities spontaneously, sensory grossly intact, no gross neuro deficits  Psych: normal mood and affect     A/P: 60F with headache and L knee pain, multiple facial abrasions and nasal laceration, will obtain CTH to evaluate for ICH, CT MaxFace and CT C spine for eval of bony injury, will obtain XR L knee for eval of bony injury, will give pain control

## 2021-11-16 NOTE — CONSULT NOTE ADULT - ASSESSMENT
Nicolas Dinero    60F hx UC/HTN s/p fall while walking dog. CTH shows small interhemispheric and minimal L frontal subdural hematoma with R temporal trace tSAH. Exam: AAOx3, EOMI, facial bruising, no drift, ROSE 5/5 except L knee pain limited.   -Repeat CTH 6hrs stability scan  -CDU for CTH AM, neurochecks, PT eval  -Repeat CTH in the AM  -Keppra 500 BID x 7 days  -no AC/AP  -F/U with dr. mercedes in 2 weeks after DC

## 2021-11-17 VITALS
SYSTOLIC BLOOD PRESSURE: 108 MMHG | HEART RATE: 60 BPM | TEMPERATURE: 98 F | DIASTOLIC BLOOD PRESSURE: 66 MMHG | RESPIRATION RATE: 20 BRPM | OXYGEN SATURATION: 97 %

## 2021-11-17 LAB — SARS-COV-2 RNA SPEC QL NAA+PROBE: SIGNIFICANT CHANGE UP

## 2021-11-17 PROCEDURE — 93005 ELECTROCARDIOGRAM TRACING: CPT | Mod: XU

## 2021-11-17 PROCEDURE — 73562 X-RAY EXAM OF KNEE 3: CPT

## 2021-11-17 PROCEDURE — 29515 APPLICATION SHORT LEG SPLINT: CPT | Mod: LT

## 2021-11-17 PROCEDURE — 86901 BLOOD TYPING SEROLOGIC RH(D): CPT

## 2021-11-17 PROCEDURE — 90715 TDAP VACCINE 7 YRS/> IM: CPT

## 2021-11-17 PROCEDURE — 70450 CT HEAD/BRAIN W/O DYE: CPT | Mod: 26,MA,77

## 2021-11-17 PROCEDURE — 72125 CT NECK SPINE W/O DYE: CPT | Mod: MA

## 2021-11-17 PROCEDURE — 85730 THROMBOPLASTIN TIME PARTIAL: CPT

## 2021-11-17 PROCEDURE — 99291 CRITICAL CARE FIRST HOUR: CPT | Mod: 25

## 2021-11-17 PROCEDURE — 12011 RPR F/E/E/N/L/M 2.5 CM/<: CPT

## 2021-11-17 PROCEDURE — 12051 INTMD RPR FACE/MM 2.5 CM/<: CPT | Mod: XU

## 2021-11-17 PROCEDURE — 99236 HOSP IP/OBS SAME DATE HI 85: CPT | Mod: 25

## 2021-11-17 PROCEDURE — U0005: CPT

## 2021-11-17 PROCEDURE — 70450 CT HEAD/BRAIN W/O DYE: CPT | Mod: 26,MA

## 2021-11-17 PROCEDURE — 73562 X-RAY EXAM OF KNEE 3: CPT | Mod: 26,LT

## 2021-11-17 PROCEDURE — 80053 COMPREHEN METABOLIC PANEL: CPT

## 2021-11-17 PROCEDURE — 86900 BLOOD TYPING SEROLOGIC ABO: CPT

## 2021-11-17 PROCEDURE — 96374 THER/PROPH/DIAG INJ IV PUSH: CPT | Mod: XU

## 2021-11-17 PROCEDURE — 76377 3D RENDER W/INTRP POSTPROCES: CPT

## 2021-11-17 PROCEDURE — 85610 PROTHROMBIN TIME: CPT

## 2021-11-17 PROCEDURE — 90471 IMMUNIZATION ADMIN: CPT

## 2021-11-17 PROCEDURE — 70450 CT HEAD/BRAIN W/O DYE: CPT | Mod: MA

## 2021-11-17 PROCEDURE — 97161 PT EVAL LOW COMPLEX 20 MIN: CPT

## 2021-11-17 PROCEDURE — 70486 CT MAXILLOFACIAL W/O DYE: CPT | Mod: MA

## 2021-11-17 PROCEDURE — U0003: CPT

## 2021-11-17 PROCEDURE — 85025 COMPLETE CBC W/AUTO DIFF WBC: CPT

## 2021-11-17 PROCEDURE — G0378: CPT

## 2021-11-17 PROCEDURE — 86850 RBC ANTIBODY SCREEN: CPT

## 2021-11-17 RX ORDER — LOSARTAN POTASSIUM 100 MG/1
50 TABLET, FILM COATED ORAL DAILY
Refills: 0 | Status: DISCONTINUED | OUTPATIENT
Start: 2021-11-17 | End: 2021-11-20

## 2021-11-17 RX ORDER — LEVETIRACETAM 250 MG/1
1 TABLET, FILM COATED ORAL
Qty: 14 | Refills: 0
Start: 2021-11-17 | End: 2021-11-23

## 2021-11-17 RX ORDER — MORPHINE SULFATE 50 MG/1
4 CAPSULE, EXTENDED RELEASE ORAL ONCE
Refills: 0 | Status: DISCONTINUED | OUTPATIENT
Start: 2021-11-17 | End: 2021-11-17

## 2021-11-17 RX ORDER — LEVETIRACETAM 250 MG/1
500 TABLET, FILM COATED ORAL
Refills: 0 | Status: DISCONTINUED | OUTPATIENT
Start: 2021-11-17 | End: 2021-11-20

## 2021-11-17 RX ORDER — ACETAMINOPHEN 500 MG
975 TABLET ORAL ONCE
Refills: 0 | Status: COMPLETED | OUTPATIENT
Start: 2021-11-17 | End: 2021-11-17

## 2021-11-17 RX ADMIN — LEVETIRACETAM 500 MILLIGRAM(S): 250 TABLET, FILM COATED ORAL at 00:46

## 2021-11-17 RX ADMIN — Medication 975 MILLIGRAM(S): at 10:30

## 2021-11-17 RX ADMIN — Medication 975 MILLIGRAM(S): at 09:21

## 2021-11-17 RX ADMIN — LOSARTAN POTASSIUM 50 MILLIGRAM(S): 100 TABLET, FILM COATED ORAL at 09:20

## 2021-11-17 RX ADMIN — MORPHINE SULFATE 4 MILLIGRAM(S): 50 CAPSULE, EXTENDED RELEASE ORAL at 00:28

## 2021-11-17 NOTE — ED CDU PROVIDER DISPOSITION NOTE - CARE PROVIDER_API CALL
Franko Moise)  Orthopaedic Surgery  [address], [address]  [place] [address]  Phone: [phone]  Fax: [phone]  Follow Up Time: 1-3 Days

## 2021-11-17 NOTE — PHYSICAL THERAPY INITIAL EVALUATION ADULT - GAIT DEVIATIONS NOTED, PT EVAL
slight antalgic/decreased melita/increased time in double stance/decreased step length/decreased stride length/decreased swing-to-stance ratio/decreased weight-shifting ability

## 2021-11-17 NOTE — ED CDU PROVIDER INITIAL DAY NOTE - ATTENDING CONTRIBUTION TO CARE
see MDM see MDM    Attending MD Rabago:   I personally have seen and examined this patient.  Physician assistant note reviewed and agree on plan of care and except where noted.  See below for details.     Seen in [facility]    60F with PMH/PSH including HTN on Losartan, Ulcerative Colitis on Entivio q2mo, denies sx, denies allergies presents to the ED with headache, abrasions to face and L knee pain s/p falling and being dragged by her dog.  Reports at around 7pm has a [place] Oakes who she was walking and then dog saw another dog, yanked her causing her to fall forward onto her face and reports was dragged by dog.  Reports now has headache. Denies change in vision, double vision, sudden loss of vision, change in hearing, tinnitus. Denies numbness, weakness or tingling in extremities. Denies loss of urinary or bowel continence. Denies chest pain, shortness of breath, palpitations. Denies abdominal pain, nausea, vomiting, diarrhea.  Denies fevers, chils.  LMP 15 yrs ago. A ten (10) point review of systems was negative other than as stated in the HPI or elsewhere in the chart. Tetanus unknown.    Exam:   General: NAD, AAOx3  HENT: head with abrasion overlying hematoma to the R forehead, abrasions to chin, +2.5 cm laceration vertically on nose, no nasal septal hematoma, no tenderness to bony bridge of nose, no dried blood on narea, no blood in oropharynx, airway patent with moist mucous membranes  Eyes: PERRL, EOMI  Lungs: lungs CTAB with good inspiratory effort, no wheezing, no rhonchi, no rales  Cardiac: +S1S2, no m/r/g  GI: abdomen soft with +BS, NT, ND  : no CVAT  MSK: FROM at neck, no tenderness to midline palpation, no stepoffs along length of spine, +tenderness to palpation and edema of L knee, limitation of range of motion secondary to pain, +ecchymosis and abrasion  Neuro: CN 2-12 grossly intact, moving all extremities spontaneously, sensory grossly intact, no gross neuro deficits  Psych: normal mood and affect     A/P: 60F with headache and L knee pain, multiple facial abrasions and nasal laceration, CDU for repeat CT head in 6 hr and in AM, neuro checks, PT eval in AM, neurosx following

## 2021-11-17 NOTE — ED CDU PROVIDER INITIAL DAY NOTE - OBJECTIVE STATEMENT
60 y F, hx of UC, HTN, presenting with multiple facial injuries and L knee pain after a mechanical fall earlier today. Patient was walking with her dog which suddenly sprinted to jhon another dog, yanking the leash and patient fell forward on her face. Injuries include swelling and laceration of nose, laceration of forehead, abrasion of chin, L knee pain. +LOC for a "few minutes", reports headache, dizziness, not on thinner, able to ambulate after the incident. Denies any other acute symptoms. Does not remember last time patient got tetanus.  In ED, patient had CT head showing Left frontal and parafalcine subdural hematoma. Trace right temporal subarachnoid hemorrhage. No midline shift or significant mass effect. No displaced calvarial fracture. Left knee x ray showed no signs of fracture or dislocation. Pt was evaluated by Neurosurgery who recommended CDU for frequent reeval, vitals q 4hrs, neuro checks, PT eval, pain control, repeat CTH 6hrs stability scan, keppra 500mg bid and monitoring.

## 2021-11-17 NOTE — PROGRESS NOTE ADULT - SUBJECTIVE AND OBJECTIVE BOX
Patient seen and examined at bedside.    --Anticoagulation--    T(C): 36.5 (21 @ 04:00), Max: 36.8 (21 @ 19:53)  HR: 60 (21 @ 04:00) (60 - 93)  BP: 102/59 (21 @ 04:00) (102/59 - 174/96)  RR: 16 (21 @ 04:00) (16 - 18)  SpO2: 97% (21 @ 04:00) (96% - 98%)  Wt(kg): --    Exam: AAOx3, EOMI, facial bruising, no drift, ROSE 5/5 except L knee pain limited.     Imaginhr repeat CTH stable, pending 9am stability scan    .  LABS:                         13.3   12.60 )-----------( 163      ( 2021 22:34 )             41.7         137  |  102  |  18  ----------------------------<  159<H>  4.1   |  24  |  0.56    Ca    9.4      2021 22:34    TPro  7.4  /  Alb  4.3  /  TBili  0.3  /  DBili  x   /  AST  52<H>  /  ALT  42  /  AlkPhos  88  11-16    PT/INR - ( 2021 22:34 )   PT: 13.1 sec;   INR: 1.10 ratio         PTT - ( 2021 22:34 )  PTT:37.0 sec          RADIOLOGY, EKG & ADDITIONAL TESTS: Reviewed.

## 2021-11-17 NOTE — PHYSICAL THERAPY INITIAL EVALUATION ADULT - PERTINENT HX OF CURRENT PROBLEM, REHAB EVAL
60F hx UC/HTN s/p fall while walking dog. CTH shows small interhemispheric and minimal L frontal subdural hematoma with R temporal trace tSAH. Exam: AAOx3, EOMI, facial bruising, no drift, ROSE 5/5 except L knee pain limited.

## 2021-11-17 NOTE — ED CDU PROVIDER INITIAL DAY NOTE - DETAILS
60 y F, hx of UC, HTN, presenting with multiple facial injuries and L knee pain after a mechanical fall earlier today.

## 2021-11-17 NOTE — ED ADULT NURSE REASSESSMENT NOTE - NS ED NURSE REASSESS COMMENT FT1
Neuro flow started and placed in chart.
Patient received from RODY Mosquera. Patient is AOx4, sensation intact, moves all extremities spontaneously, 3mm PERRLA bilaterally, left knee swelling/pain/ecchymosis (impacting mobility), facial lac/swelling, VSS, abdomen soft/non tender, +pulses, and feels better after pain meds. PAtient still endorses left knee pain, ice pack applied. Patient ambulated to  with granddaughter at bedside. All safety precautions maintained, call bell at bedside and will continue to monitor.
Patient discharged to home as per MD order. PIV discontinued per protocol. Discharge instructions given and reviewed.
Patient s/p fall, in NAD, VSS. Patient with facial lacerations and left knee swelling, no active bleeding at this time. Patient c/o headache, KAIN Roa made aware. PO Tylenol to be given per order. Will continue to monitor. Safety maintained.

## 2021-11-17 NOTE — CHART NOTE - NSCHARTNOTEFT_GEN_A_CORE
Neurosurgery reviewed patient's CTH from this AM, read as stable. Pt should f/u outpatient with Dr. Reyna in 1-2 weeks.

## 2021-11-17 NOTE — PHYSICAL THERAPY INITIAL EVALUATION ADULT - ADDITIONAL COMMENTS
Pt reports she lives with her  in an apartment with 4 steps to enter & 1st floor set up. 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 Pt drives.

## 2021-11-17 NOTE — ED CDU PROVIDER DISPOSITION NOTE - NSFOLLOWUPINSTRUCTIONS_ED_ALL_ED_FT
As per Neurosurgery........  1) Follow-up with...................... Call today / next business day for prompt follow-up.  Rick Reyna)  Neurosurgery  [address], [address]  [place] [address]  Phone: [phone]  Fax: [phone]  2) Return to Emergency room for any worsening or persistent pain, weakness, fever, or any other concerning symptoms.  3) See attached instruction sheets for additional information, including information regarding signs and symptoms to look out for, reasons to seek immediate care and other important instructions.  4) Follow-up with any specialists as discussed / noted as well. 1. Rest. Stay hydrated. Keep knee wrap in place.  2. Continue your current home medications.  Take Keppra 500mg every 12 hours for 1 week for seizure prevention. Take tylenol over the counter as needed for headache/pain.  3. Follow-up with your medical doctor in 2-3 days.  Bring your results with you for follow-up.  You will need to have your sutures removed in 5 days.  You can return to the ER to get that done.     Follow-up with Neurosurgery Dr. Reyna in 1 week. Call today to make the appointment.  Rick Reyna)  Neurosurgery  [address], [address]  [place] [address]  Phone: [phone]  Fax: [phone]    Follow up with Orthopedics for left knee pain/swelling in 2-3 days.  Call for appointment.  Franko Moise)  Orthopaedic Surgery  [address], [address]  [place] [address]  Phone: [phone]  Fax: [phone]  Follow Up Time: 1-3 Days    4. Return to the ER if you have any worsening symptoms, chest pain, difficulty breathing, fevers, chills, weakness, vomiting, speech changes, worsening headache or dizziness, or any other concerns.    5. See attached instruction sheets for additional information, including information regarding signs and symptoms to look out for, reasons to seek immediate care and other important instructions.

## 2021-11-17 NOTE — ED CDU PROVIDER INITIAL DAY NOTE - MEDICAL DECISION MAKING DETAILS
Attn - fall with facial injury including laceration to nose and ICH - SDH and SAH, neuro intact.  NSurg consult and repeat CT head at 3am and 9am, neuro checks. reassess.  Tylenol and Zofran.

## 2021-11-17 NOTE — PROGRESS NOTE ADULT - ASSESSMENT
60F hx UC/HTN s/p fall while walking dog. CTH shows small interhemispheric and minimal L frontal subdural hematoma with R temporal trace tSAH. Exam: AAOx3, EOMI, facial bruising, no drift, ROSE 5/5 except L knee pain limited.   -CTH 6hrs stable   -Repeat CTH @9am  -Keppra 500 BID x 7 days  -no AC/AP  -F/U with dr. mercedes in 2 weeks after DC

## 2021-11-17 NOTE — ED CDU PROVIDER INITIAL DAY NOTE - PHYSICAL EXAMINATION
Gen: Patient is well-appearing, NAD, AAOx3  HEENT: head with abrasion overlying hematoma to the R forehead, abrasions to chin, +2.5 cm laceration vertically on nose closed w/ sutures, no nasal septal hematoma, no tenderness to bony bridge of nose, no dried blood on narea, no blood in oropharynx, airway patent with moist mucous membranes  Lung: CTAB, no respiratory distress, no wheezes/rhonchi/rales B/L, speaking in full sentences  CV: RRR, no murmurs, rubs or gallops, distal pulses 2+ b/l  Abd: soft, NT, ND, no guarding, no rigidity, no rebound tenderness  MSK: +Left knee swelling w/ +ttp and decrease ROM due to pain., minor abrasions on bilateral knees, no back TTP. No able to ambulate due to pain.  Neuro: No focal sensory deficits  Skin: Warm, well perfused

## 2021-11-17 NOTE — ED CDU PROVIDER INITIAL DAY NOTE - PROGRESS NOTE DETAILS
pt with fall and laceration to nose and SDH and SAH.  repeat CT head at 3am show decreased blood.  NSurg recommending next CT head at 9am.  pt c/o pain left knee with swelling and headache with nausea and vomiting.  no numbness or weakness. pt with headache, tylenol offering relief. seen by PT, ambulated independently, no PT needs. pt had 3rd head CT, remains stable. cleared by neurosurgery. discussed with Dr. Bello, pt stable for d/c home. -Bhakti Vasquez PA-C pt with headache, tylenol offering relief. seen by PT, ambulated independently, no PT needs. pt had 3rd head CT, remains stable. cleared by neurosurgery. doss dressing applied to left knee. discussed with Dr. Bello, pt stable for d/c home. -Bhakti Vasquez PA-C

## 2021-11-17 NOTE — ED CDU PROVIDER DISPOSITION NOTE - PATIENT PORTAL LINK FT
You can access the FollowMyHealth Patient Portal offered by [facility] by registering at the following website: [email or web address]. By joining [employer]’s FollowMyHealth portal, you will also be able to view your health information using other applications (apps) compatible with our system.

## 2021-11-18 ENCOUNTER — APPOINTMENT (OUTPATIENT)
Dept: ORTHOPEDIC SURGERY | Facility: CLINIC | Age: 61
End: 2021-11-18
Payer: COMMERCIAL

## 2021-11-18 PROCEDURE — 99213 OFFICE O/P EST LOW 20 MIN: CPT

## 2021-11-18 NOTE — ED PROCEDURE NOTE - CPROC ED TIME OUT STATEMENT1
“Patient's name, , procedure and correct site were confirmed during the [place] Timeout.”
“Patient's name, , procedure and correct site were confirmed during the [place] Timeout.”

## 2021-11-18 NOTE — PHYSICAL EXAM
[de-identified] : Patient is WDWN, alert, and in no acute distress. Breathing is unlabored. She is grossly oriented to person, place, and time.\par \par Left Knee:\par Pain elicited with flexion and extension of the knee.\par Extensive ecchymosis and effusion noted to the knee\par ROM: 0-90°\par Patellar and quadriceps tendon intact.\par   [de-identified] : EXAM: XR KNEE AP LAT OBL 3 VIEWS LT - 11/17/21\par IMPRESSION:\par No acute fracture or dislocation.\par Marked anterior soft tissue swelling.\par \par KANDI OSPINA MD; Resident Radiology\par This document has been electronically signed.\par ELIEZER FOFANA MD; Attending Radiologist\par This document has been electronically signed. Nov 17 2021 10:19AM

## 2021-11-18 NOTE — DISCUSSION/SUMMARY
[de-identified] : The underlying pathophysiology was reviewed with the patient. XR films were reviewed with the patient. Discussed at length the nature of the patient’s condition. The left knee symptoms appear secondary to contusion.\par \par She was advised that no fractures are evident and that she sustained a contusion to the knee. I discussed with her and her daughter that the bruising and effusion will take time to resolve. In the interim, she may utilize ice for pain and swelling as well as elevate the leg.\par She was provided with a prescription for Ibuprofen 600mg, BID (30 tablets).\par She may use topical analgesics as needed for pain.\par A note was provided stating she will remain out of work until further notice due to the injury she sustained two days ago on 11/6/21.\par \par All questions answered, understanding verbalized. Patient in agreement with plan of care. Follow up in 2 weeks for reassessment.

## 2021-11-18 NOTE — END OF VISIT
[FreeTextEntry3] : All medical record entries made by the Scribe were at my,  Dr. Franko Moise MD., direction and personally dictated by me on 11/18/2021. I have personally reviewed the chart and agree that the record accurately reflects my personal performance of the history, physical exam, assessment and plan.

## 2021-11-18 NOTE — HISTORY OF PRESENT ILLNESS
[de-identified] : Pt is a 61 y/o female c/o left knee pain x 2 days.  DOI: 11/16/21. She was walking her daughter's dog and when he saw another dog, he pulled the leash.  She fell forward landing on her left knee and also her face.  She had swelling and ecchymosis immediately.  She went to the ED where xrays were taken.  They were negative for fracture.  She has pain with all ROM of the knee.  It is difficult to bear weight.  She is currently taking Tylenol for pain but it does not help.

## 2021-11-18 NOTE — ADDENDUM
[FreeTextEntry1] : I, Huma Lui wrote this note acting as a scribe for Dr. Franko Moise on Nov 18, 2021.

## 2021-11-18 NOTE — RETURN TO WORK/SCHOOL
[FreeTextEntry1] : Ms. NORMAN GORE was seen in the office today on 11/18/2021 and evaluated by me for an Orthopedic visit. Please be advised that she will be out of work until further notice due to a left knee injury sustained on 11/16/21.\par \par Sincerely,\par \par Dr. Franko Moise M.D. on 11/18/2021.

## 2021-11-19 ENCOUNTER — NON-APPOINTMENT (OUTPATIENT)
Age: 61
End: 2021-11-19

## 2021-11-22 ENCOUNTER — EMERGENCY (EMERGENCY)
Facility: HOSPITAL | Age: 61
LOS: 1 days | Discharge: HOME | End: 2021-11-22
Attending: EMERGENCY MEDICINE
Payer: COMMERCIAL

## 2021-11-22 VITALS
RESPIRATION RATE: 16 BRPM | OXYGEN SATURATION: 98 % | DIASTOLIC BLOOD PRESSURE: 91 MMHG | SYSTOLIC BLOOD PRESSURE: 145 MMHG | HEART RATE: 70 BPM | TEMPERATURE: 98 F

## 2021-11-22 VITALS
RESPIRATION RATE: 16 BRPM | HEART RATE: 74 BPM | HEIGHT: 60 IN | DIASTOLIC BLOOD PRESSURE: 96 MMHG | TEMPERATURE: 98 F | OXYGEN SATURATION: 96 % | SYSTOLIC BLOOD PRESSURE: 152 MMHG

## 2021-11-22 PROCEDURE — 99284 EMERGENCY DEPT VISIT MOD MDM: CPT

## 2021-11-22 PROCEDURE — 70450 CT HEAD/BRAIN W/O DYE: CPT | Mod: MA

## 2021-11-22 PROCEDURE — 70450 CT HEAD/BRAIN W/O DYE: CPT | Mod: 26,MA

## 2021-11-22 PROCEDURE — 99284 EMERGENCY DEPT VISIT MOD MDM: CPT | Mod: 25

## 2021-11-22 NOTE — ED PROVIDER NOTE - CLINICAL SUMMARY MEDICAL DECISION MAKING FREE TEXT BOX
NORMAN GORE is a 60 YEAR OLD FEMALE PMH IBD, HTN who presents to ER for CC of Suture Removal - well healed wound on nasal bridge w/ no s/sx of infection. Will remove sutures today. Also with continued H/A in the setting of small interhemispheric and minimal L frontal subdural hematoma with R temporal trace tSAH with plan for outpatient neurosurgery F/U on 12/2/2022 - given complaint, will obtain CT Head to evaluate progression.

## 2021-11-22 NOTE — ED ADULT NURSE NOTE - NSICDXNOPASTSURGICALHX_GEN_ALL_CORE
<-- Click to add NO significant Past Surgical History
[FreeTextEntry1] : Patient is a 65F with PMH of HLD, colon polyps, ovarian mass who presents for post operative visit s/p exploratory laparotomy, low anterior resection, NAFISA BSO (Dr. Gutierrez) for complicated sigmoid diverticulitis with colovesicle fistula and adnexal lesion.  Pathology showed diverticulitis and a serious cystadenoma. She presents today for follow up.  Patient is tolerating a diet and moving her bowels. Denies current UTI symptoms.  Patient denies fevers, chills, nausea, vomiting, constipation, diarrhea, blood in her stool or unexpected weight loss.  Patient denies a family history of colon cancer rectal cancer or inflammatory bowel disease.

## 2021-11-22 NOTE — CONSULT NOTE ADULT - SUBJECTIVE AND OBJECTIVE BOX
p (1480)     HPI:    60F hx UC/HTN s/p fall while walking dog. On initial visit CTH showed small interhemispheric and minimal L frontal subdural hematoma with R temporal trace tSAH. Exam: AAOx3, EOMI, facial bruising, no drift, ROSE 5/5 except L knee pain limited.     Presents today to ED for suture removal. CTH shows resolution of SDH.     --Anticoagulation:    =====================  PAST MEDICAL HISTORY   HTN (hypertension)    Colitis    Ulcerative colitis      PAST SURGICAL HISTORY   No significant past surgical history          MEDICATIONS:  Antibiotics:    Neuro:    Other:      SOCIAL HISTORY:   Occupation:   Marital Status:     FAMILY HISTORY:  Family history of hypertension in father (Father)        ROS: Negative except per HPI    .  LABS:                     RADIOLOGY, EKG & ADDITIONAL TESTS: Reviewed.

## 2021-11-22 NOTE — ED PROVIDER NOTE - CARE PROVIDER_API CALL
Rick Reyna)  Neurosurgery  [address], [address]  [place] [address]  Phone: [phone]  Fax: [phone]  Follow Up Time:

## 2021-11-22 NOTE — ED ADULT NURSE NOTE - NSIMPLEMENTINTERV_GEN_ALL_ED
Implemented All Fall Risk Interventions:  [place] to call system. Call bell, personal items and telephone within reach. Instruct patient to call for assistance. Room bathroom lighting operational. Non-slip footwear when patient is off stretcher. Physically safe environment: no spills, clutter or unnecessary equipment. Stretcher in lowest position, wheels locked, appropriate side rails in place. Provide visual cue, wrist band, yellow gown, etc. Monitor gait and stability. Monitor for mental status changes and reorient to person, place, and time. Review medications for side effects contributing to fall risk. Reinforce activity limits and safety measures with patient and family.

## 2021-11-22 NOTE — ED PROVIDER NOTE - SKIN LOCATION #1
WELL-HEALED LINEAR LACERATION OF NASAL BRIDGE WITH 5 SUTURES IN PLACE; NO ERYTHEMA, NTTP, NO PURULENT DRAINAGE/nose

## 2021-11-22 NOTE — ED ADULT NURSE NOTE - OBJECTIVE STATEMENT
0950 60 yr old female here for suture removal. s/p sutures to nose in ER 11/16/2021 after falling. No LOC. c/o persistent HA since then. Was diagnosed with "3 small head bleeds" per . Ecchymoses periorbital region and nose. Denies dizziness. A&Ox4. PERRL. Color pink. Skin W&D. Dr cervantes pt. Brain CT ordered

## 2021-11-22 NOTE — ED PROVIDER NOTE - ATTENDING CONTRIBUTION TO CARE
RGUJRAL 61yo f hx listed s/p recent mechanical fall with head injury, noted to have SDH, IPH and SAH with stable CT and discharge. Pt states she is doing well but still has persistent HA. Denies any change in vision, n/v. Tolerating PO. No other complaints. Today pt presents for nasal bridge suture removal. Wound healing well, no discharge or ttp. On exam, Patient is awake, alert and oriented x 3.  Patient is well appearing and in no acute distress. PERRL, EOMI.  Neck is supple, No posterior tenderness.   Lungs are CTA B/L,+S1S2 no murmurs,  Abdomen:Soft nd/nt+bs no rebound or guarding.  Extremity no edema or calf tender.  Skin with no rash.  Neuro CN3-12 intact. Strength 5/5 in upper and lower extremities. Nml Sensation.Gait normal.   Suture removal and wound are discussed. CT head, pt declined pain meds.

## 2021-11-22 NOTE — ED PROVIDER NOTE - PATIENT PORTAL LINK FT
Lab: 6 You can access the FollowMyHealth Patient Portal offered by [facility] by registering at the following website: [email or web address]. By joining [facility]’s FollowMyHealth portal, you will also be able to view your health information using other applications (apps) compatible with our system.

## 2021-11-22 NOTE — ED PROVIDER NOTE - OBJECTIVE STATEMENT
NORMAN GORE is a 60 YEAR OLD FEMALE PMH IBD, HTN who presents to ER for CC of Suture Removal.  Sustained laceration of nasal bridge 11/17/2021 which was repaired with 5 nylon sutures.  Denies fevers, swelling, pain, erythema, purulent drainage, dehiscence of wound NORMAN GORE is a 60 YEAR OLD FEMALE PMH IBD, HTN who presents to ER for CC of Suture Removal.  Sustained laceration of nasal bridge (after fall) 11/17/2021 which was repaired with 5 nylon sutures.  Denies fevers, swelling, pain, erythema, purulent drainage, dehiscence of wound    Of note, patient also had CT Head at the time which showed small interhemispheric and minimal L frontal subdural hematoma with R temporal trace tSAH - seen by Neurosurgery who cleared patient for DC to home with 1-2 week F/U outpatient. Patient has appointment for 12/2/2022.  NORMAN continues to endorse headache.  Denies vomiting, visual changes, syncope, dizziness.    PMH: IBD, HTN  Meds: Listed  NKDA  IUTD  Social History: Unremarkable

## 2021-11-22 NOTE — CONSULT NOTE ADULT - ASSESSMENT
60F hx UC/HTN s/p fall while walking dog. CTH shows resolution of small interhemispheric and minimal L frontal subdural hematoma. Exam: AAOx3, EOMI, diminished facial bruising, no drift, ROSE 5/5 except L knee pain limited.   -No neurosurgical contraindication to discharge  -Please follow up with dr. mercedes in 1-2 weeks

## 2021-11-22 NOTE — ED PROVIDER NOTE - NSFOLLOWUPINSTRUCTIONS_ED_ALL_ED_FT
NORMAN was seen in the ER for a suture removal.    We took her Sutures (or stitches) out of the area on her nose.    You can apply Bacitracin once in the morning and once in the evening to the area.    The CT Scan of the Head showed that things were improving. Please keep your Neurosurgery appointment with Dr. Reyna.      Get help right away if:  •You have:  •A severe headache that is not helped by medicine.      •Trouble walking or weakness in your arms and legs.      •Clear or bloody fluid coming from your nose or ears.      •Changes in your vision.      •A seizure.      •Increased confusion or irritability.        •Your symptoms get worse.      •You are sleepier than normal and have trouble staying awake.      •You lose your balance.      •Your pupils change size.      •Your speech is slurred.      •Your dizziness gets worse.      •You vomit.

## 2021-11-22 NOTE — ED PROVIDER NOTE - ENMT, MLM
Bruising of the face noted after traumatic fall 5 days ago. Airway patent, Nasal mucosa clear. Mouth with normal mucosa. Throat has no vesicles, no oropharyngeal exudates and uvula is midline.

## 2021-11-22 NOTE — ED PROVIDER NOTE - PROGRESS NOTE DETAILS
Spoke to neurosurgery, ct head stable plan to dc and follow up outpatient with Dr. Reyna. Plan discussed with patient including return precautions. MASSIEL

## 2021-11-29 NOTE — ED PROVIDER NOTE - GASTROINTESTINAL [+], MLM
ABDOMINAL PAIN/DIARRHEA/hematochezia Tremfya Counseling: I discussed with the patient the risks of guselkumab including but not limited to immunosuppression, serious infections, worsening of inflammatory bowel disease and drug reactions.  The patient understands that monitoring is required including a PPD at baseline and must alert us or the primary physician if symptoms of infection or other concerning signs are noted.

## 2021-12-02 ENCOUNTER — APPOINTMENT (OUTPATIENT)
Dept: ORTHOPEDIC SURGERY | Facility: CLINIC | Age: 61
End: 2021-12-02
Payer: COMMERCIAL

## 2021-12-02 DIAGNOSIS — S80.02XA CONTUSION OF LEFT KNEE, INITIAL ENCOUNTER: ICD-10-CM

## 2021-12-02 PROCEDURE — 73564 X-RAY EXAM KNEE 4 OR MORE: CPT | Mod: LT

## 2021-12-02 PROCEDURE — 99213 OFFICE O/P EST LOW 20 MIN: CPT

## 2021-12-02 NOTE — RETURN TO WORK/SCHOOL
[FreeTextEntry1] : Ms. NORMAN GORE was seen in the office today on 12/02/2021 and evaluated by me for an Orthopedic visit. Please be advised that she will remain out of work until further notice as she is currently being treated by me for a left knee injury.\par \par Sincerely,\par \par Dr. Franko Moise M.D. on 12/02/2021.

## 2021-12-02 NOTE — PHYSICAL EXAM
[de-identified] : Patient is WDWN, alert, and in no acute distress. Breathing is unlabored. She is grossly oriented to person, place, and time.\par \par Left Knee:\par Pain elicited with flexion and extension of the knee.\par Extensive ecchymosis and effusion noted to the knee\par ROM: 0-90°\par Patellar and quadriceps tendon intact. [de-identified] : 12/2/21 --> AP, lateral and 2 oblique views of the left knee were obtained today and demonstrate no acute fracture. No dislocation. Cartilage spaces are maintained. \par \par \par EXAM: XR KNEE AP LAT OBL 3 VIEWS LT - 11/17/21\par IMPRESSION:\par No acute fracture or dislocation.\par Marked anterior soft tissue swelling.\par \par KANDI OSPINA MD; Resident Radiology\par This document has been electronically signed.\par ELIEZER FOFANA MD; Attending Radiologist\par This document has been electronically signed. Nov 17 2021 10:19AM.

## 2021-12-02 NOTE — END OF VISIT
[FreeTextEntry3] : All medical record entries made by the Scribe were at my,  Dr. Franko Moise MD., direction and personally dictated by me on 12/02/2021. I have personally reviewed the chart and agree that the record accurately reflects my personal performance of the history, physical exam, assessment and plan.

## 2021-12-02 NOTE — DISCUSSION/SUMMARY
[de-identified] : The underlying pathophysiology was reviewed with the patient. XR films were reviewed with the patient. Discussed at length the nature of the patient’s condition. The left knee symptoms appear secondary to contusion.\par \par At this time, given the continued nature of the patient's symptoms I am referring her to physical therapy.\par The patient wishes to proceed with physical therapy of the left knee for ROM and strengthening. A script was given.	\par A note was provided to the patient stating she will remain out of work until further notice.	 \par Topical analgesics as needed.\par NSAIDs or Tylenol prn.\par \par All questions answered, understanding verbalized. Patient in agreement with plan of care. Follow up in 6 weeks.

## 2021-12-02 NOTE — HISTORY OF PRESENT ILLNESS
[de-identified] : Pt is a 61 y/o female c/o left knee pain x 2 days. DOI: 11/16/21. She was walking her daughter's dog and when he saw another dog, he pulled the leash. She fell forward landing on her left knee and also her face. She had swelling and ecchymosis immediately. She went to the ED where xrays were taken. They were negative for fracture. She has pain with all ROM of the knee. It is difficult to bear weight. She was treated on 11/18/21 at which time she was prescribed Ibuprofen 600mg and provided with a note stating she would remain out of work for two weeks at which time she would return to the office for reassessment. She returns on 12/2/21 and reports the Ibuprofen has helped her pain. However she has continued bruising and notable edema to the knee over the patella.

## 2021-12-02 NOTE — ADDENDUM
[FreeTextEntry1] : I, Huma Lui wrote this note acting as a scribe for Dr. Franko Moise on Dec 02, 2021.

## 2021-12-03 ENCOUNTER — APPOINTMENT (OUTPATIENT)
Dept: NEUROSURGERY | Facility: CLINIC | Age: 61
End: 2021-12-03
Payer: COMMERCIAL

## 2021-12-03 VITALS
HEART RATE: 65 BPM | HEIGHT: 60 IN | DIASTOLIC BLOOD PRESSURE: 86 MMHG | WEIGHT: 170 LBS | BODY MASS INDEX: 33.38 KG/M2 | TEMPERATURE: 97.8 F | SYSTOLIC BLOOD PRESSURE: 122 MMHG | OXYGEN SATURATION: 97 %

## 2021-12-03 DIAGNOSIS — Z87.19 PERSONAL HISTORY OF OTHER DISEASES OF THE DIGESTIVE SYSTEM: ICD-10-CM

## 2021-12-03 PROCEDURE — 99204 OFFICE O/P NEW MOD 45 MIN: CPT

## 2021-12-09 ENCOUNTER — RX RENEWAL (OUTPATIENT)
Age: 61
End: 2021-12-09

## 2021-12-12 NOTE — PHYSICAL EXAM
[General Appearance - Alert] : alert [General Appearance - In No Acute Distress] : in no acute distress [General Appearance - Well Nourished] : well nourished [General Appearance - Well-Appearing] : healthy appearing [Oriented To Time, Place, And Person] : oriented to person, place, and time [Impaired Insight] : insight and judgment were intact [Affect] : the affect was normal [Memory Recent] : recent memory was not impaired [Person] : oriented to person [Place] : oriented to place [Time] : oriented to time [Short Term Intact] : short term memory intact [Cranial Nerves Optic (II)] : visual acuity intact bilaterally,  pupils equal round and reactive to light [Cranial Nerves Oculomotor (III)] : extraocular motion intact [Cranial Nerves Trigeminal (V)] : facial sensation intact symmetrically [Cranial Nerves Facial (VII)] : face symmetrical [Cranial Nerves Vestibulocochlear (VIII)] : hearing was intact bilaterally [Cranial Nerves Glossopharyngeal (IX)] : tongue and palate midline [Cranial Nerves Accessory (XI - Cranial And Spinal)] : head turning and shoulder shrug symmetric [Cranial Nerves Hypoglossal (XII)] : there was no tongue deviation with protrusion [Motor Tone] : muscle tone was normal in all four extremities [Motor Strength] : muscle strength was normal in all four extremities [Involuntary Movements] : no involuntary movements were seen [Sensation Tactile Decrease] : light touch was intact [Sensation Pain / Temperature Decrease] : pain and temperature was intact [Balance] : balance was intact [Sclera] : the sclera and conjunctiva were normal [PERRL With Normal Accommodation] : pupils were equal in size, round, reactive to light, with normal accommodation [Outer Ear] : the ears and nose were normal in appearance [Both Tympanic Membranes Were Examined] : both tympanic membranes were normal [Neck Appearance] : the appearance of the neck was normal [] : no respiratory distress [Respiration, Rhythm And Depth] : normal respiratory rhythm and effort [Apical Impulse] : the apical impulse was normal [Heart Rate And Rhythm] : heart rate was normal and rhythm regular [Arterial Pulses Carotid] : carotid pulses were normal with no bruits [Abdominal Aorta] : the abdominal aorta was normal [Bowel Sounds] : normal bowel sounds [Abdomen Soft] : soft [No CVA Tenderness] : no ~M costovertebral angle tenderness [No Spinal Tenderness] : no spinal tenderness [Abnormal Walk] : normal gait [Skin Color & Pigmentation] : normal skin color and pigmentation [FreeTextEntry8] : no drift, mild imbalance on tandem  [FreeTextEntry1] : left knee pain

## 2021-12-12 NOTE — RESULTS/DATA
[FreeTextEntry1] : EXAM: CT BRAIN\par \par \par PROCEDURE DATE: 11/22/2021\par \par \par \par \par INTERPRETATION: Noncontrast CT of the brain.\par \par CLINICAL INDICATION: Headache, recent acute subdural hemorrhage\par \par TECHNIQUE : Axial CT scanning of the brain was obtained from the skull base to the vertex without the administration of intravenous contrast. Sagittal and coronal reformats were provided.\par \par COMPARISON: CT brain 11/17/2021\par \par FINDINGS:\par \par Overall decreased acute interhemispheric subdural hemorrhage which now extends to the bilateral tentorial leaves. This is likely secondary to layering.\par \par No acute parenchymal hemorrhage or brain edema.\par \par No midline shift or effacement of the basal cisterns. No hydrocephalus.\par \par No displaced calvarial fracture.\par \par The visualized paranasal sinuses and mastoid air cells are clear.\par \par IMPRESSION:\par \par Overall decreased interhemispheric subdural hemorrhage which now extends to the bilateral tentorial leaves secondary to layering.\par \par No acute parenchymal hemorrhage or brain edema.\par \par No displaced calvarial fracture.

## 2021-12-12 NOTE — REVIEW OF SYSTEMS
[Dizziness] : dizziness [Cluster Headache] : cluster headaches [Negative] : Heme/Lymph [FreeTextEntry4] : tinnitus

## 2021-12-12 NOTE — HISTORY OF PRESENT ILLNESS
[< 3 months] : less than 3 months [FreeTextEntry1] : SDH  [de-identified] : Mrs. 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 Pt with hx of  UC/HTN,  s/p fall on 11/16/21  while walking dog. On initial visit in [employer] [facility]  CTH showed small interhemispheric and minimal L frontal subdural hematoma with R temporal trace. Had nasal sutures which was removed few days ago. repeat CT head on 11/22/21  showed decrease in SDH.\par  \par Pt here for a hospital follow up. She has Headache whole day, at the whole head 6-7/10 with  temporary relief with NSAIDs. She feel pressure in her facial area most of the time. Pt states that her HA aggregate with   cough, sneeze and bend down. She c/o   blurred vision occasionally. Pt also reports  dizziness while changing  position and mildly nauseated when she has  headache.  She endorses ringing in both ears. Pt has left knee pain under the care of orthopedic, had follow up yesterday and was advised PT and local analgesics. Denies vomiting , speech impairment, vision problems or seizures.\par

## 2021-12-13 ENCOUNTER — APPOINTMENT (OUTPATIENT)
Dept: CT IMAGING | Facility: IMAGING CENTER | Age: 61
End: 2021-12-13
Payer: COMMERCIAL

## 2021-12-13 ENCOUNTER — OUTPATIENT (OUTPATIENT)
Dept: OUTPATIENT SERVICES | Facility: HOSPITAL | Age: 61
LOS: 1 days | End: 2021-12-13
Payer: COMMERCIAL

## 2021-12-13 DIAGNOSIS — S06.9X9A UNSPECIFIED INTRACRANIAL INJURY WITH LOSS OF CONSCIOUSNESS OF UNSPECIFIED DURATION, INITIAL ENCOUNTER: ICD-10-CM

## 2021-12-13 DIAGNOSIS — Z00.8 ENCOUNTER FOR OTHER GENERAL EXAMINATION: ICD-10-CM

## 2021-12-13 PROCEDURE — 70450 CT HEAD/BRAIN W/O DYE: CPT

## 2021-12-13 PROCEDURE — 70450 CT HEAD/BRAIN W/O DYE: CPT | Mod: 26

## 2021-12-17 ENCOUNTER — APPOINTMENT (OUTPATIENT)
Dept: NEUROSURGERY | Facility: CLINIC | Age: 61
End: 2021-12-17
Payer: COMMERCIAL

## 2021-12-17 VITALS
BODY MASS INDEX: 30.12 KG/M2 | SYSTOLIC BLOOD PRESSURE: 127 MMHG | OXYGEN SATURATION: 97 % | HEIGHT: 63 IN | HEART RATE: 68 BPM | DIASTOLIC BLOOD PRESSURE: 85 MMHG | TEMPERATURE: 97.4 F | WEIGHT: 170 LBS

## 2021-12-17 PROCEDURE — 99214 OFFICE O/P EST MOD 30 MIN: CPT

## 2021-12-17 RX ORDER — IBUPROFEN 600 MG/1
600 TABLET, FILM COATED ORAL
Qty: 60 | Refills: 0 | Status: DISCONTINUED | COMMUNITY
Start: 2021-11-18 | End: 2021-12-17

## 2021-12-17 RX ORDER — MELOXICAM 7.5 MG/1
7.5 TABLET ORAL
Qty: 30 | Refills: 0 | Status: DISCONTINUED | COMMUNITY
Start: 2021-07-01 | End: 2021-12-17

## 2021-12-18 NOTE — ASSESSMENT
[FreeTextEntry1] : Impression:\par \par Mrs. Rincon is a 60 Years old female [employer] , S/P fall with SDH. Pt with mild headache which is relieved temporarily with Tylenol.  Repeated imaging on 12/13/21 with nearly resolved SDH.No neurological deficit . Pt need repeat imaging for follow up.\par  \par \par PLAN:\par \par Advised to avoid NSAIDs for pain\par CT head no contrast in 1 month\par F/U in 1 month

## 2021-12-18 NOTE — REVIEW OF SYSTEMS
[Dizziness] : dizziness [Lightheadedness] : lightheadedness [Cluster Headache] : cluster headaches [Negative] : Endocrine [FreeTextEntry4] : tinnitus

## 2021-12-18 NOTE — RESULTS/DATA
Patient has the following symptoms: chest pains  The symptoms have been present for/ on going  Patient was not offered an appointment.  Pharmacy has been verified.    [FreeTextEntry1] : EXAM: CT BRAIN\par \par \par PROCEDURE DATE: 12/13/2021\par \par \par \par INTERPRETATION: CT HEAD\par \par CLINICAL HISTORY: pt with h/ofall with SDH for follow up;\par \par IMAGING TECHNIQUE:\par Noncontrast CT. Axial Acqisition. Sagittal and coronal reformations.\par \par COMPARISON:\par Compared to study dated November 22, 2021\par \par FINDINGS:\par * HEMORRHAGE: Subdural hemorrhage along the posterior falx and tentorium continues to improve. There is trace was residual measuring up to 2 mm in thickness. No significant associated mass effect. No new areas of intracranial hemorrhage.\par * BRAIN PARENCHYMA: Brain parenchyma exhibits normal attenuation. No parenchymal mass or mass effect. Corpus callosum well formed. No cerebellar tonsillar ectopia.\par * VENTRICLES / SHIFT: No hydrocephalus. No midline shift.\par * BASAL CISTERNS: Basal cisterns preserved.\par * CALVARIUM AND EXTRACRANIAL SOFT TISSUES: No depressed calvarial fracture.\par * SINUSES, ORBITS, MASTOIDS: The visualized paranasal sinuses and mastoid air cells are well aerated.\par \par IMPRESSION:\par SUBDURAL HEMORRHAGES ALONG THE POSTERIOR FALX AND TENTORIUM CONTINUE TO IMPROVE. TRACE RESIDUAL MEASURING UP TO 2 MM IN THICKNESS. NO ASSOCIATED MASS EFFECT. NO NEW AREAS OF INTRACRANIAL HEMORRHAGE. NO HYDROCEPHALUS.

## 2021-12-18 NOTE — PHYSICAL EXAM
[General Appearance - Alert] : alert [General Appearance - In No Acute Distress] : in no acute distress [General Appearance - Well Nourished] : well nourished [General Appearance - Well-Appearing] : healthy appearing [Oriented To Time, Place, And Person] : oriented to person, place, and time [Impaired Insight] : insight and judgment were intact [Affect] : the affect was normal [Memory Recent] : recent memory was not impaired [Person] : oriented to person [Place] : oriented to place [Time] : oriented to time [Short Term Intact] : short term memory intact [Cranial Nerves Optic (II)] : visual acuity intact bilaterally,  pupils equal round and reactive to light [Cranial Nerves Oculomotor (III)] : extraocular motion intact [Cranial Nerves Trigeminal (V)] : facial sensation intact symmetrically [Cranial Nerves Facial (VII)] : face symmetrical [Cranial Nerves Vestibulocochlear (VIII)] : hearing was intact bilaterally [Cranial Nerves Glossopharyngeal (IX)] : tongue and palate midline [Cranial Nerves Accessory (XI - Cranial And Spinal)] : head turning and shoulder shrug symmetric [Cranial Nerves Hypoglossal (XII)] : there was no tongue deviation with protrusion [Motor Tone] : muscle tone was normal in all four extremities [Motor Strength] : muscle strength was normal in all four extremities [Sensation Tactile Decrease] : light touch was intact [Sensation Pain / Temperature Decrease] : pain and temperature was intact [Balance] : balance was intact [Sclera] : the sclera and conjunctiva were normal [PERRL With Normal Accommodation] : pupils were equal in size, round, reactive to light, with normal accommodation [Outer Ear] : the ears and nose were normal in appearance [Both Tympanic Membranes Were Examined] : both tympanic membranes were normal [Neck Appearance] : the appearance of the neck was normal [] : no respiratory distress [Respiration, Rhythm And Depth] : normal respiratory rhythm and effort [Apical Impulse] : the apical impulse was normal [Heart Rate And Rhythm] : heart rate was normal and rhythm regular [Arterial Pulses Carotid] : carotid pulses were normal with no bruits [Abdominal Aorta] : the abdominal aorta was normal [Bowel Sounds] : normal bowel sounds [Abdomen Soft] : soft [No CVA Tenderness] : no ~M costovertebral angle tenderness [No Spinal Tenderness] : no spinal tenderness [Abnormal Walk] : normal gait [Involuntary Movements] : no involuntary movements were seen [Skin Color & Pigmentation] : normal skin color and pigmentation [FreeTextEntry8] : no drift, no imbalance on tandem

## 2021-12-18 NOTE — HISTORY OF PRESENT ILLNESS
[FreeTextEntry1] : Mrs. 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 Pt with hx of UC/HTN, s/p fall on 11/16/21 while walking dog. On initial visit in [facility] CTH showed small interhemispheric and minimal L frontal subdural hematoma with R temporal trace. Had nasal sutures which was removed few days ago. repeat CT head on 11/22/21 showed decrease in SDH.\par  \par Pt here for an other follow up.  She still get some headache sporadically, average twice/ day.  Headache feels at right temporal rated  3-4/10 with temporary relief with Tylenol.  Her  pressure in her facial area are also getting better. Pt states that her  blurred vision is also resolving. She denies dizziness , nausea or vomiting. She still  endorses ringing in both ears. Pt has left knee pain under the care of orthopedic, will be starting  PT soon. \par

## 2021-12-28 ENCOUNTER — APPOINTMENT (OUTPATIENT)
Dept: ORTHOPEDIC SURGERY | Facility: CLINIC | Age: 61
End: 2021-12-28
Payer: COMMERCIAL

## 2021-12-28 VITALS — WEIGHT: 170 LBS | HEIGHT: 63 IN | BODY MASS INDEX: 30.12 KG/M2

## 2021-12-28 PROCEDURE — 99214 OFFICE O/P EST MOD 30 MIN: CPT

## 2021-12-28 PROCEDURE — 73030 X-RAY EXAM OF SHOULDER: CPT | Mod: RT

## 2021-12-30 ENCOUNTER — OUTPATIENT (OUTPATIENT)
Dept: OUTPATIENT SERVICES | Facility: HOSPITAL | Age: 61
LOS: 1 days | End: 2021-12-30
Payer: COMMERCIAL

## 2021-12-30 ENCOUNTER — APPOINTMENT (OUTPATIENT)
Dept: MRI IMAGING | Facility: CLINIC | Age: 61
End: 2021-12-30
Payer: COMMERCIAL

## 2021-12-30 DIAGNOSIS — Z00.8 ENCOUNTER FOR OTHER GENERAL EXAMINATION: ICD-10-CM

## 2021-12-30 PROCEDURE — 73221 MRI JOINT UPR EXTREM W/O DYE: CPT | Mod: 26,RT

## 2021-12-30 PROCEDURE — 73221 MRI JOINT UPR EXTREM W/O DYE: CPT

## 2022-01-04 ENCOUNTER — APPOINTMENT (OUTPATIENT)
Dept: ORTHOPEDIC SURGERY | Facility: CLINIC | Age: 62
End: 2022-01-04
Payer: COMMERCIAL

## 2022-01-04 VITALS — BODY MASS INDEX: 29.23 KG/M2 | HEIGHT: 63 IN | WEIGHT: 165 LBS

## 2022-01-04 DIAGNOSIS — M75.121 COMPLETE ROTATOR CUFF TEAR OR RUPTURE OF RIGHT SHOULDER, NOT SPECIFIED AS TRAUMATIC: ICD-10-CM

## 2022-01-04 DIAGNOSIS — S46.011A STRAIN OF MUSCLE(S) AND TENDON(S) OF THE ROTATOR CUFF OF RIGHT SHOULDER, INITIAL ENCOUNTER: ICD-10-CM

## 2022-01-04 PROCEDURE — 99214 OFFICE O/P EST MOD 30 MIN: CPT | Mod: 25

## 2022-01-04 PROCEDURE — 20610 DRAIN/INJ JOINT/BURSA W/O US: CPT | Mod: RT

## 2022-01-04 NOTE — HISTORY OF PRESENT ILLNESS
[de-identified] : Nicolas is a 61 year old RHD female who presents with right shoulder pain. Patient's primary language is Nepali.  She reports falling after tripping over her dogs leash December 1, 2021. She has not had any imaging of the right shoulder. Her pain radiates down her arm to her mid humerus. No pain at the elbow or below. Patient denies any neck pain or any numbness or tingling.  Patient is here for MRI review

## 2022-01-04 NOTE — PHYSICAL EXAM
[de-identified] : Upper Extremity \par o Shoulder :\par ¦ Inspection/Palpation : tenderness over the greater tuberosity, no swelling or deformities\par ¦ Range of Motion : flexion to 110 degrees, exernal rotation 45 degrees, internal rotation to T12, no crepitance\par ¦ Strength : supraspinatus 3+/5, external rotation 3+/5, internal rotation 5/5\par ¦ Stability : no joint instability on provocative testing\par ¦ Tests/Signs : Sood (+), Neer (+)\par o Upper Arm : no tenderness, swelling or deformities\par o Muscle Bulk : no atrophy o Sensation : sensation intact to light touch \par o Skin : no skin rash or discoloration\par o Vascular Exam : no edema or cyanosis, radial and ulnar pulses normal \par  [de-identified] : Patient comes to today's visit with outside imaging already performed. I reviewed the images in detail with the patient and discussed the findings as highlighted below.\par \par \par \par EXAM: MR SHOULDER RT\par \par \par PROCEDURE DATE: 12/30/2021\par \par \par \par INTERPRETATION: MR SHOULDER RIGHT\par \par HISTORY: Right shoulder pain. Symptoms since November, 2021. Evaluate for rotator cuff tear.\par \par TECHNIQUE: Multiplanar MRI of the right shoulder was performed without contrast using 5 sequences.\par \par COMPARISON: None.\par \par FINDINGS:\par \par OSSEOUS STRUCTURES\par  Fractures/Contusions: None.\par \par ROTATOR CUFF TENDONS\par  Supraspinatous Tendon: Approximately 75% thickness delaminating articular surface tear is present with fibers retracted by approximately 1.8 cm. 0.8 cm full-thickness component is present posteriorly. There is mild tendinopathy.\par  Infraspinatus Tendon: Mild tendinopathy is present with mild intrasubstance tearing.\par  Teres Minor Tendon: Intact.\par  Subscapularis Tendon: Mild tendinopathy is present.\par  Muscle Atrophy: Mild supraspinatus and infraspinatus muscle volume loss is noted.\par \par CORACOACROMIAL ARCH & AC JOINT\par  Coracohumeral Space: Widely patent.\par  Anterior Acromial Morphology: Bigliani type I\par  Acromiohumeral Space: Widely patent.\par  Acromioclavicular Joint: Mild arthrosis is present.\par  Subacromial/Subdeltoid Bursa: There is a small to moderate amount of bursal fluid and/or inflammation.\par \par GLENOID LABRUM AND BICEPS TENDON\par  Biceps-Labral Complex: Intact.\par  Glenoid Labrum: There is degenerative blunting of the labrum.\par  Biceps Tendon (long head): Mild intra-articular tendinopathy is present.\par \par GLENOHUMERAL JOINT\par  Articular Cartilage: Intact.\par \par JOINT CAPSULE\par  Effusion/Synovitis: Small to moderate effusion is present with mild synovial thickening.\par  Intra-articular Bodies: None.\par  Inferior Glenohumeral Ligament: Unremarkable.\par \par SOFT TISSUES\par  Musculature: Otherwise unremarkable.\par  Subcutaneous Tissues: Unremarkable.\par \par  Other Findings: There are several axillary lymph nodes with the largest measuring up to 1.0 x 1.3 x 2.0 cm.\par \par IMPRESSION:\par 1. Mild to moderate rotator cuff tendinopathy with approximately 75% thickness delaminating articular surface tear of the supraspinatus with 0.8 cm full-thickness component.\par 2. Mild intrasubstance tearing of the infraspinatus.\par 3. Small to moderate joint effusion is present with mild synovial thickening and mild to moderate distention of the overlying bursa.\par 4. Mild intra-articular biceps tendinopathy.\par 5. Borderline axillary adenopathy.

## 2022-01-04 NOTE — PROCEDURE
[de-identified] : At this point I recommended a therapeutic injection and under sterile precautions an injection of 4 cc 1% lidocaine with 0.5 cc of Kenalog and 0.5 cc of Dexamethasone- was placed into the subacromial space of the Right shoulder without complication, and after several minutes, the patient felt significant relief.\par \par \par

## 2022-01-04 NOTE — DISCUSSION/SUMMARY
[de-identified] : The underlying pathophysiology was reviewed in great detail with the patient as well as the various treatment options, including ice, analgesics, NSAIDS, Physical therapy, steroid injections and surgery.\par \par MRI of the right shoulder reviewed and discussed in great detail today.\par  Discussed surgical intervention versus conservative management.  The risks and benefits of a right arthroscopic rotator cuff repair were reviewed and discussed in great detail. In addition to conservative management of physical therapy and home exercise program. Patient would like to proceed with conservative management at this time. \par \par Patient received a corticosteroid injection of the right shoulder today.\par \par A prescription for Physical Therapy was provided.\par \par Activity modifications and restrictions were discussed. I advised avoiding overhead lifting. I advised the patient to work on good posture.\par \par A home exercise sheet was given and discussed with the patient to follow.\par \par FU 6 weeks \par \par All questions were answered, all alternatives discussed and the patient is in complete agreement with that plan. Follow-up appointment as instructed. Any issues and the patient will call or come in sooner.

## 2022-01-13 ENCOUNTER — APPOINTMENT (OUTPATIENT)
Dept: CT IMAGING | Facility: IMAGING CENTER | Age: 62
End: 2022-01-13

## 2022-01-19 ENCOUNTER — NON-APPOINTMENT (OUTPATIENT)
Age: 62
End: 2022-01-19

## 2022-01-27 ENCOUNTER — APPOINTMENT (OUTPATIENT)
Dept: ORTHOPEDIC SURGERY | Facility: CLINIC | Age: 62
End: 2022-01-27
Payer: COMMERCIAL

## 2022-01-27 DIAGNOSIS — M25.562 PAIN IN LEFT KNEE: ICD-10-CM

## 2022-01-27 PROCEDURE — 99214 OFFICE O/P EST MOD 30 MIN: CPT

## 2022-01-27 NOTE — PHYSICAL EXAM
[de-identified] : Patient is WDWN, alert, and in no acute distress. Breathing is unlabored. She is grossly oriented to person, place, and time.\par \par Left Knee:\par Pain elicited with flexion and extension of the knee.\par Extensive ecchymosis and effusion noted to the knee\par ROM: 0-90°\par Patellar and quadriceps tendon intact.  [de-identified] : no new imaging today

## 2022-01-27 NOTE — HISTORY OF PRESENT ILLNESS
[de-identified] : Pt is a 59 y/o female c/o left knee pain. DOI: 11/16/21. She was walking her daughter's dog and when he saw another dog, he pulled the leash. She fell forward landing on her left knee and also her face. She had swelling and ecchymosis immediately. She went to the ED where xrays were taken. They were negative for fracture. She has pain with all ROM of the knee. It is difficult to bear weight. She was treated on 11/18/21 at which time she was prescribed Ibuprofen 600mg and provided with a note stating she would remain out of work for two weeks at which time she would return to the office for reassessment. She returns on 1/27/22 and states she is feeling well overall. She is currently in PT for her shoulder and would like an RX for her knee.

## 2022-01-27 NOTE — RETURN TO WORK/SCHOOL
[FreeTextEntry1] : Ms. NORMAN GORE was seen in the office today on 01/27/2022 and evaluated by me for an Orthopedic visit. Please be advised that she may return to work at full duty on 2/16/2022.\par \par Sincerely,\par \par Dr. Franko Moise M.D. on 01/27/2022.

## 2022-01-27 NOTE — ADDENDUM
[FreeTextEntry1] : I, Huma Lui wrote this note acting as a scribe for Dr. Franko Moise on Jan 27, 2022.

## 2022-01-27 NOTE — END OF VISIT
[FreeTextEntry3] : All medical record entries made by the Scribe were at my,  Dr. Franko Moise MD., direction and personally dictated by me on 01/27/2022. I have personally reviewed the chart and agree that the record accurately reflects my personal performance of the history, physical exam, assessment and plan.

## 2022-01-28 ENCOUNTER — OUTPATIENT (OUTPATIENT)
Dept: OUTPATIENT SERVICES | Facility: HOSPITAL | Age: 62
LOS: 1 days | End: 2022-01-28
Payer: COMMERCIAL

## 2022-01-28 ENCOUNTER — APPOINTMENT (OUTPATIENT)
Dept: CT IMAGING | Facility: IMAGING CENTER | Age: 62
End: 2022-01-28
Payer: COMMERCIAL

## 2022-01-28 DIAGNOSIS — S06.9X9A UNSPECIFIED INTRACRANIAL INJURY WITH LOSS OF CONSCIOUSNESS OF UNSPECIFIED DURATION, INITIAL ENCOUNTER: ICD-10-CM

## 2022-01-28 PROCEDURE — 70450 CT HEAD/BRAIN W/O DYE: CPT

## 2022-01-28 PROCEDURE — 70450 CT HEAD/BRAIN W/O DYE: CPT | Mod: 26

## 2022-01-31 ENCOUNTER — APPOINTMENT (OUTPATIENT)
Dept: NEUROSURGERY | Facility: CLINIC | Age: 62
End: 2022-01-31
Payer: COMMERCIAL

## 2022-01-31 VITALS
HEART RATE: 71 BPM | BODY MASS INDEX: 29.23 KG/M2 | DIASTOLIC BLOOD PRESSURE: 88 MMHG | HEIGHT: 63 IN | OXYGEN SATURATION: 96 % | WEIGHT: 165 LBS | SYSTOLIC BLOOD PRESSURE: 145 MMHG | TEMPERATURE: 98.1 F

## 2022-01-31 PROCEDURE — 99214 OFFICE O/P EST MOD 30 MIN: CPT

## 2022-02-04 NOTE — PHYSICAL EXAM
[General Appearance - Alert] : alert [General Appearance - In No Acute Distress] : in no acute distress [General Appearance - Well Nourished] : well nourished [General Appearance - Well-Appearing] : healthy appearing [Oriented To Time, Place, And Person] : oriented to person, place, and time [Impaired Insight] : insight and judgment were intact [Affect] : the affect was normal [Memory Recent] : recent memory was not impaired [Person] : oriented to person [Place] : oriented to place [Time] : oriented to time [Short Term Intact] : short term memory intact [Cranial Nerves Optic (II)] : visual acuity intact bilaterally,  pupils equal round and reactive to light [Cranial Nerves Oculomotor (III)] : extraocular motion intact [Cranial Nerves Trigeminal (V)] : facial sensation intact symmetrically [Cranial Nerves Facial (VII)] : face symmetrical [Cranial Nerves Vestibulocochlear (VIII)] : hearing was intact bilaterally [Cranial Nerves Accessory (XI - Cranial And Spinal)] : head turning and shoulder shrug symmetric [Motor Tone] : muscle tone was normal in all four extremities [Motor Strength] : muscle strength was normal in all four extremities [Sensation Tactile Decrease] : light touch was intact [Sensation Pain / Temperature Decrease] : pain and temperature was intact [Sclera] : the sclera and conjunctiva were normal [PERRL With Normal Accommodation] : pupils were equal in size, round, reactive to light, with normal accommodation [Outer Ear] : the ears and nose were normal in appearance [Both Tympanic Membranes Were Examined] : both tympanic membranes were normal [Neck Appearance] : the appearance of the neck was normal [] : no respiratory distress [Respiration, Rhythm And Depth] : normal respiratory rhythm and effort [Apical Impulse] : the apical impulse was normal [Heart Rate And Rhythm] : heart rate was normal and rhythm regular [Arterial Pulses Carotid] : carotid pulses were normal with no bruits [Abdominal Aorta] : the abdominal aorta was normal [Bowel Sounds] : normal bowel sounds [Abdomen Soft] : soft [No CVA Tenderness] : no ~M costovertebral angle tenderness [No Spinal Tenderness] : no spinal tenderness [Abnormal Walk] : normal gait [Involuntary Movements] : no involuntary movements were seen [Skin Color & Pigmentation] : normal skin color and pigmentation [FreeTextEntry8] : no drift, mild imbalance at tandem

## 2022-02-04 NOTE — RESULTS/DATA
[FreeTextEntry1] : EXAM: CT BRAIN\par \par \par PROCEDURE DATE: 01/28/2022\par \par \par \par INTERPRETATION: CLINICAL INDICATION: Follow-up posterior falcine and tentorial subdural hematoma\par \par 5mm axial sections of the brain were obtained from base to vertex, without the intravenous administration of contrast material. Coronal and sagittal computer generated reconstructed views are available.\par \par Comparison is made with the prior CT of 12/13/2021 and 11/22/2021.\par \par In the interval since the previous examinations the posterior falcine and tentorial subdural hemorrhage has completely resolved.\par \par \par The fourth, third and lateral ventricles are normal size and position. There is no hemorrhage, mass or shift of the midline structures. There is normal gray white matter differentiation. Bone window examination is unremarkable.\par \par IMPRESSION: Posterior falcine and tentorial subdural hemorrhage has resolved since 12/13/2021.

## 2022-02-04 NOTE — ASSESSMENT
[FreeTextEntry1] : Impression:\par \par Mrs. Rincon is a 60 Years old female NW , S/P fall with SDH. Pt with no symptoms at this time.  Repeated imaging on 1/26/22 with resolved SDH. No neurological deficit at examination.  Pt under orthopedic management for shoulder pain and knee pain. Pt was advised to go back to work by ortho on 2/16/21. \par  \par \par PLAN:\par \par Pt may go back to work as planned.\par Continue PT for knee and shoulder as advised by orthopedic. \par Precautions given for  prevention of further trauma/ fall.\par Will follow up as needed. \par
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
## 2023-01-12 NOTE — ED PROVIDER NOTE - CROS ED NEURO POS
Spironolactone Pregnancy And Lactation Text: This medication can cause feminization of the male fetus and should be avoided during pregnancy. The active metabolite is also found in breast milk. lightheaded
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
## 2023-04-10 NOTE — ED ADULT TRIAGE NOTE - STATUS:
----- Message from Jesica Zavala sent at 4/10/2023 11:16 AM CDT -----  Type:  Test Results    Who Called:  self    Name of Test (Lab/Mammo/Etc):  lab    Date of Test:  4/5/2023    Ordering Provider: SELENA Ruano    Where the test was performed:  [facility]    Would the patient rather a call back or a response via My [facility]?  call    Best Call Back Number:  [phone] (home)      Additional Information:       For Clinical Team:Has the provider reviewed the results?       Applied

## 2023-07-04 NOTE — ED PROVIDER NOTE - NSFOLLOWUPINSTRUCTIONS_ED_ALL_ED_FT
ambulatory
You were evaluated in the Emergency Department for finger pain.  You were evaluated and examined by a physician, and you had an xray of your right middle + ring finger + hand. Based on your evaluation, you were found to have a fracture of your finger.    A fracture is a break in one of your bones. This can occur from a variety of injuries, especially traumatic ones. Symptoms include pain, bruising, or swelling. Do not use the injured limb. If a fracture is in one of the bones below your waist, do not put weight on that limb unless instructed to do so by your healthcare provider. Crutches or a cane may have been provided. A splint or cast may have been applied by your health care provider. Make sure to keep it dry and follow up with an orthopedist as instructed.    There are no signs of emergency conditions requiring admission to the hospital on today's workup.  Based on the evaluation, a presumptive diagnosis was made, however, further evaluation may be required by your primary care physician or a specialist for a more definitive diagnosis.  Therefore, please follow-up as directed or return to the Emergency Department if your symptoms change or worsen.    SEEK IMMEDIATE MEDICAL CARE IF YOU HAVE ANY OF THE FOLLOWING SYMPTOMS: numbness, tingling, increasing pain, or weakness in any part of the injured limb.    We recommend that you:  1. See your primary care physician within the next 72 hours for follow up.  Bring a copy of your discharge paperwork (including any test results) to your doctor.  2. Please call and set up an appointment with the hand surgeon.  3. Tylenol up to 650 mg every 8 hours as needed for pain and/or Motrin up to 600 mg every 6 hours as needed for pain. Take any prescribed medications as instructed:

## 2023-07-13 NOTE — PROGRESS NOTE ADULT - ATTENDING COMMENTS
F/U call to [employer] by [employer] regarding pt's Imbruvica. Per Davida, they did run a claim thru pt's new insurance [facility] ( Medicaid Plan) and it was denied but they are working on a PA. If they need any further information they will reach out to us.  Spoke to pt and informed her of update and to call me if she hears from [employer] and they have questions.  Pt verbalized understanding  
discussed with family at bedside in detail
discussed with patient in detail, all questions answered

## 2023-07-21 ENCOUNTER — APPOINTMENT (OUTPATIENT)
Dept: INTERNAL MEDICINE | Facility: CLINIC | Age: 63
End: 2023-07-21
Payer: COMMERCIAL

## 2023-07-21 ENCOUNTER — LABORATORY RESULT (OUTPATIENT)
Age: 63
End: 2023-07-21

## 2023-07-21 ENCOUNTER — NON-APPOINTMENT (OUTPATIENT)
Age: 63
End: 2023-07-21

## 2023-07-21 VITALS
WEIGHT: 178 LBS | TEMPERATURE: 97.9 F | HEIGHT: 63 IN | BODY MASS INDEX: 31.54 KG/M2 | HEART RATE: 62 BPM | OXYGEN SATURATION: 98 % | DIASTOLIC BLOOD PRESSURE: 90 MMHG | SYSTOLIC BLOOD PRESSURE: 150 MMHG

## 2023-07-21 VITALS — SYSTOLIC BLOOD PRESSURE: 140 MMHG | DIASTOLIC BLOOD PRESSURE: 90 MMHG

## 2023-07-21 DIAGNOSIS — Z71.85 ENCOUNTER FOR IMMUNIZATION SAFETY COUNSELING: ICD-10-CM

## 2023-07-21 DIAGNOSIS — Z11.3 ENCOUNTER FOR SCREENING FOR INFECTIONS WITH A PREDOMINANTLY SEXUAL MODE OF TRANSMISSION: ICD-10-CM

## 2023-07-21 DIAGNOSIS — Z82.49 FAMILY HISTORY OF ISCHEMIC HEART DISEASE AND OTHER DISEASES OF THE CIRCULATORY SYSTEM: ICD-10-CM

## 2023-07-21 DIAGNOSIS — Z12.9 ENCOUNTER FOR SCREENING FOR MALIGNANT NEOPLASM, SITE UNSPECIFIED: ICD-10-CM

## 2023-07-21 DIAGNOSIS — Z13.228 ENCOUNTER FOR SCREENING FOR OTHER METABOLIC DISORDERS: ICD-10-CM

## 2023-07-21 DIAGNOSIS — Z13.820 ENCOUNTER FOR SCREENING FOR OSTEOPOROSIS: ICD-10-CM

## 2023-07-21 DIAGNOSIS — Z13.6 ENCOUNTER FOR SCREENING FOR CARDIOVASCULAR DISORDERS: ICD-10-CM

## 2023-07-21 PROCEDURE — 93000 ELECTROCARDIOGRAM COMPLETE: CPT

## 2023-07-21 PROCEDURE — 99204 OFFICE O/P NEW MOD 45 MIN: CPT | Mod: 25

## 2023-07-23 PROBLEM — Z71.85 VACCINE COUNSELING: Status: ACTIVE | Noted: 2023-07-21

## 2023-07-23 PROBLEM — Z82.49 FAMILY HISTORY OF EARLY CAD: Status: ACTIVE | Noted: 2023-07-21

## 2023-07-23 PROBLEM — Z13.228 SCREENING FOR METABOLIC DISORDER: Status: ACTIVE | Noted: 2023-07-21

## 2023-07-23 PROBLEM — Z11.3 SCREENING FOR STD (SEXUALLY TRANSMITTED DISEASE): Status: ACTIVE | Noted: 2023-07-21

## 2023-07-23 PROBLEM — Z12.9 CANCER SCREENING: Status: ACTIVE | Noted: 2023-07-21

## 2023-07-23 PROBLEM — Z13.6 SCREENING FOR HEART DISEASE: Status: ACTIVE | Noted: 2023-07-21

## 2023-07-23 PROBLEM — Z13.820 SCREENING FOR OSTEOPOROSIS: Status: ACTIVE | Noted: 2023-07-21

## 2023-07-23 NOTE — PHYSICAL EXAM
[No Acute Distress] : no acute distress [Well Nourished] : well nourished [Well Developed] : well developed [Well-Appearing] : well-appearing [Normal Sclera/Conjunctiva] : normal sclera/conjunctiva [PERRL] : pupils equal round and reactive to light [EOMI] : extraocular movements intact [Normal Outer Ear/Nose] : the outer ears and nose were normal in appearance [Normal Oropharynx] : the oropharynx was normal [No JVD] : no jugular venous distention [No Lymphadenopathy] : no lymphadenopathy [Supple] : supple [Thyroid Normal, No Nodules] : the thyroid was normal and there were no nodules present [No Respiratory Distress] : no respiratory distress  [No Accessory Muscle Use] : no accessory muscle use [Clear to Auscultation] : lungs were clear to auscultation bilaterally [Normal Rate] : normal rate  [Regular Rhythm] : with a regular rhythm [Normal S1, S2] : normal S1 and S2 [No Murmur] : no murmur heard [No Carotid Bruits] : no carotid bruits [No Varicosities] : no varicosities [Pedal Pulses Present] : the pedal pulses are present [No Edema] : there was no peripheral edema [No Extremity Clubbing/Cyanosis] : no extremity clubbing/cyanosis [Soft] : abdomen soft [Non Tender] : non-tender [Non-distended] : non-distended [Normal Bowel Sounds] : normal bowel sounds [Normal Anterior Cervical Nodes] : no anterior cervical lymphadenopathy [No CVA Tenderness] : no CVA  tenderness [No Spinal Tenderness] : no spinal tenderness [No Joint Swelling] : no joint swelling [Grossly Normal Strength/Tone] : grossly normal strength/tone [No Rash] : no rash [Coordination Grossly Intact] : coordination grossly intact [No Focal Deficits] : no focal deficits [Normal Gait] : normal gait [Normal Affect] : the affect was normal [Alert and Oriented x3] : oriented to person, place, and time

## 2023-07-23 NOTE — HISTORY OF PRESENT ILLNESS
[FreeTextEntry1] : new pcp  [de-identified] : NORMAN GORE is a 62 year old female with PMH of HTN, ulceratives colitis, SDH wrist pain presented to the office today for new pcp\par Follows SANDIE David\par Said her wbc was recently high then GI repeated and it was normal\par Patient feels well. No complaints. Denies chest pain, sob, beltran, dizziness, diaphoresis, palpitations, LE swelling, orthopnea, syncope, n/v, headache.\par

## 2023-07-23 NOTE — HEALTH RISK ASSESSMENT
[0] : 2) Feeling down, depressed, or hopeless: Not at all (0) [PHQ-2 Negative - No further assessment needed] : PHQ-2 Negative - No further assessment needed [Never] : Never [[id number]] : 0

## 2023-07-24 LAB
25(OH)D3 SERPL-MCNC: 36.7 NG/ML
ALBUMIN SERPL ELPH-MCNC: 4.4 G/DL
ALP BLD-CCNC: 80 U/L
ALT SERPL-CCNC: 27 U/L
ANION GAP SERPL CALC-SCNC: 11 MMOL/L
AST SERPL-CCNC: 37 U/L
BILIRUB SERPL-MCNC: 0.2 MG/DL
BUN SERPL-MCNC: 17 MG/DL
CALCIUM SERPL-MCNC: 9.6 MG/DL
CHLORIDE SERPL-SCNC: 103 MMOL/L
CHOLEST SERPL-MCNC: 235 MG/DL
CK SERPL-CCNC: 42 U/L
CO2 SERPL-SCNC: 27 MMOL/L
CREAT SERPL-MCNC: 0.59 MG/DL
EGFR: 102 ML/MIN/1.73M2
ESTIMATED AVERAGE GLUCOSE: 154 MG/DL
FERRITIN SERPL-MCNC: 127 NG/ML
FOLATE SERPL-MCNC: >20 NG/ML
GLUCOSE SERPL-MCNC: 85 MG/DL
HBA1C MFR BLD HPLC: 7 %
HDLC SERPL-MCNC: 51 MG/DL
IRON SATN MFR SERPL: 21 %
IRON SERPL-MCNC: 76 UG/DL
LDLC SERPL CALC-MCNC: 159 MG/DL
NONHDLC SERPL-MCNC: 184 MG/DL
POTASSIUM SERPL-SCNC: 5 MMOL/L
PROT SERPL-MCNC: 7.4 G/DL
SODIUM SERPL-SCNC: 141 MMOL/L
T4 FREE SERPL-MCNC: 1.1 NG/DL
TIBC SERPL-MCNC: 361 UG/DL
TRIGL SERPL-MCNC: 138 MG/DL
TSH SERPL-ACNC: 3.75 UIU/ML
UIBC SERPL-MCNC: 285 UG/DL
VIT B12 SERPL-MCNC: 610 PG/ML

## 2023-07-25 ENCOUNTER — LABORATORY RESULT (OUTPATIENT)
Age: 63
End: 2023-07-25

## 2023-07-25 LAB
CHOLEST SERPL-MCNC: 252 MG/DL
HDLC SERPL-MCNC: 58 MG/DL
LDLC SERPL CALC-MCNC: 175 MG/DL
NONHDLC SERPL-MCNC: 195 MG/DL
TRIGL SERPL-MCNC: 109 MG/DL

## 2023-07-27 ENCOUNTER — APPOINTMENT (OUTPATIENT)
Dept: CT IMAGING | Facility: CLINIC | Age: 63
End: 2023-07-27
Payer: COMMERCIAL

## 2023-07-27 ENCOUNTER — OUTPATIENT (OUTPATIENT)
Dept: OUTPATIENT SERVICES | Facility: HOSPITAL | Age: 63
LOS: 1 days | End: 2023-07-27
Payer: COMMERCIAL

## 2023-07-27 DIAGNOSIS — Z82.49 FAMILY HISTORY OF ISCHEMIC HEART DISEASE AND OTHER DISEASES OF THE CIRCULATORY SYSTEM: ICD-10-CM

## 2023-07-27 DIAGNOSIS — Z00.8 ENCOUNTER FOR OTHER GENERAL EXAMINATION: ICD-10-CM

## 2023-07-27 PROCEDURE — 75571 CT HRT W/O DYE W/CA TEST: CPT

## 2023-07-27 PROCEDURE — 75571 CT HRT W/O DYE W/CA TEST: CPT | Mod: 26

## 2023-07-28 ENCOUNTER — OUTPATIENT (OUTPATIENT)
Dept: OUTPATIENT SERVICES | Facility: HOSPITAL | Age: 63
LOS: 1 days | Discharge: HOME | End: 2023-07-28

## 2023-07-28 DIAGNOSIS — Z12.89 ENCOUNTER FOR SCREENING FOR MALIGNANT NEOPLASM OF OTHER SITES: ICD-10-CM

## 2023-07-31 ENCOUNTER — LABORATORY RESULT (OUTPATIENT)
Age: 63
End: 2023-07-31

## 2023-07-31 ENCOUNTER — APPOINTMENT (OUTPATIENT)
Dept: HEMATOLOGY ONCOLOGY | Facility: CLINIC | Age: 63
End: 2023-07-31
Payer: COMMERCIAL

## 2023-07-31 ENCOUNTER — RESULT REVIEW (OUTPATIENT)
Age: 63
End: 2023-07-31

## 2023-07-31 VITALS
TEMPERATURE: 97.3 F | SYSTOLIC BLOOD PRESSURE: 145 MMHG | HEART RATE: 74 BPM | HEIGHT: 59.06 IN | BODY MASS INDEX: 35.56 KG/M2 | RESPIRATION RATE: 16 BRPM | DIASTOLIC BLOOD PRESSURE: 99 MMHG | OXYGEN SATURATION: 98 % | WEIGHT: 176.37 LBS

## 2023-07-31 DIAGNOSIS — Z82.49 FAMILY HISTORY OF ISCHEMIC HEART DISEASE AND OTHER DISEASES OF THE CIRCULATORY SYSTEM: ICD-10-CM

## 2023-07-31 DIAGNOSIS — D72.829 ELEVATED WHITE BLOOD CELL COUNT, UNSPECIFIED: ICD-10-CM

## 2023-07-31 DIAGNOSIS — D72.820 LYMPHOCYTOSIS (SYMPTOMATIC): ICD-10-CM

## 2023-07-31 DIAGNOSIS — Z80.3 FAMILY HISTORY OF MALIGNANT NEOPLASM OF BREAST: ICD-10-CM

## 2023-07-31 LAB
BASOPHILS # BLD AUTO: 0.1 K/UL — SIGNIFICANT CHANGE UP (ref 0–0.2)
BASOPHILS NFR BLD AUTO: 0.4 % — SIGNIFICANT CHANGE UP (ref 0–2)
EOSINOPHIL # BLD AUTO: 0.3 K/UL — SIGNIFICANT CHANGE UP (ref 0–0.5)
EOSINOPHIL NFR BLD AUTO: 1.2 % — SIGNIFICANT CHANGE UP (ref 0–6)
HCT VFR BLD CALC: 39.1 % — SIGNIFICANT CHANGE UP (ref 34.5–45)
HGB BLD-MCNC: 12.9 G/DL — SIGNIFICANT CHANGE UP (ref 11.5–15.5)
IMM GRANULOCYTES NFR BLD AUTO: 0.3 % — SIGNIFICANT CHANGE UP (ref 0–0.9)
LYMPHOCYTES # BLD AUTO: 17 K/UL — HIGH (ref 1–3.3)
LYMPHOCYTES # BLD AUTO: 68.2 % — HIGH (ref 13–44)
MCHC RBC-ENTMCNC: 30.4 PG — SIGNIFICANT CHANGE UP (ref 27–34)
MCHC RBC-ENTMCNC: 33 G/DL — SIGNIFICANT CHANGE UP (ref 32–36)
MCV RBC AUTO: 92 FL — SIGNIFICANT CHANGE UP (ref 80–100)
MONOCYTES # BLD AUTO: 0.8 K/UL — SIGNIFICANT CHANGE UP (ref 0–0.9)
MONOCYTES NFR BLD AUTO: 3.2 % — SIGNIFICANT CHANGE UP (ref 2–14)
NEUTROPHILS # BLD AUTO: 6.67 K/UL — SIGNIFICANT CHANGE UP (ref 1.8–7.4)
NEUTROPHILS NFR BLD AUTO: 26.7 % — LOW (ref 43–77)
NRBC # BLD: 0 /100 WBCS — SIGNIFICANT CHANGE UP (ref 0–0)
PLATELET # BLD AUTO: 154 K/UL — SIGNIFICANT CHANGE UP (ref 150–400)
RBC # BLD: 4.25 M/UL — SIGNIFICANT CHANGE UP (ref 3.8–5.2)
RBC # FLD: 13 % — SIGNIFICANT CHANGE UP (ref 10.3–14.5)
WBC # BLD: 24.94 K/UL — HIGH (ref 3.8–10.5)
WBC # FLD AUTO: 24.94 K/UL — HIGH (ref 3.8–10.5)

## 2023-07-31 PROCEDURE — 99215 OFFICE O/P EST HI 40 MIN: CPT

## 2023-07-31 NOTE — ED ADULT NURSE NOTE - NSFALLRSKASSESSTYPE_ED_ALL_ED
Visit Discharge/Physician Orders     Discharge condition: Stable     Assessment of pain at discharge: moderate     Anesthetic used: 4% lidocaine     Discharge to: Home     Left via:Private automobile     Accompanied by: n/a     ECF/HHA: [address]     Dressing Orders: Sternum: Cleanse wound with normal saline, apply plain alginate and cover with ABD's. Secure with paper tape. Change daily. Abdomen: cleanse wounds with normal saline, apply wet to dry gauze. Change daily       Treatment Orders: Maintain blood sugars within normal limits. Multivitamin daily. Diet high in protein and vitamin C.      [address] [place] [address] followup visit ________1 week_____________________  (Please note your next appointment above and if you are unable to keep, kindly give a 24 hour notice. Thank you.)     Physician signature:__________________________        If you experience any of the following, please call the [address] [place] [address] during business hours:     * Increase in Pain  * Temperature over 101  * Increase in drainage from your wound  * Drainage with a foul odor  * Bleeding  * Increase in swelling  * Need for compression bandage changes due to slippage, breakthrough drainage. If you need medical attention outside of the business hours of the [address] [place] [address] please contact your PCP or go to the nearest emergency room.
Initial (On Arrival)

## 2023-08-05 ENCOUNTER — NON-APPOINTMENT (OUTPATIENT)
Age: 63
End: 2023-08-05

## 2023-08-05 NOTE — REVIEW OF SYSTEMS
[Fatigue] : fatigue [Joint Pain] : joint pain [Muscle Weakness] : muscle weakness [Negative] : Neurological [Night Sweats] : no night sweats [Muscle Pain] : no muscle pain [FreeTextEntry4] : tinnitus post trauma - went to [facility] ED, no vertigo [FreeTextEntry7] : UC [FreeTextEntry9] : right wrist pain ? tendonitis; occas weakness calves

## 2023-08-05 NOTE — HISTORY OF PRESENT ILLNESS
[de-identified] : initial vidit. [de-identified] : Ms. Rincon is a 63 yo woman referred for evaluation of leukocytosis.  She has a h/o HTN, ulcerative colitis, HLD, T2D. By history, elevated WBC was noted then repeated and found to be normal by her gastroenterologist. On 7/21/23, WBC was 30.27, Hgb 13.1, Plt 154K, 72.9% lymphs, 20% PMN. Repeat CBC 7/25/23 showed similar results. She has no constitutional c/o. IBD symptoms are well controlled, receives a disease modifying agent ? tremfya. Recent CT heart score did not detect LAD. She has had self limited SAH w/o sequelae several yr ago post injury.

## 2023-08-05 NOTE — ASSESSMENT
[FreeTextEntry1] : 61 yo woman with recently detected leukocytosis, predominanty lymphocytes. Hgb, plt have been normal. She has a h/o UC controlled with anti-TNF agent. These agents infrequently c cause hematologic effects, beltran. thrombocytopenia. There is longstanding concern regarding increased risk of developing lymphoma, which is very low and is not firmly established with respect to causality. In the case of CLL, which hasto be considered as the most likely diagnosis, TNF-a has been thought to be an autocrine growth factor so that blocking TNF could be, speculatively, thought to impede development of CLL. CBC results reviewed and d/w pt: WBC 24.94, Hgb 12.9, Plt 154K, ANC 6.67, ALC 17.0 Bloodwork will be obtained today, in particular flow cytometry looking for a clonal B-cell lymphoproliferative disorder, starting with CLL. LDH, uric acid, immunoelectrophoresis and viral serologies will also be checked. If CLL is confirmed, she'll need additional bloodwork looking at prognostic markers, incl. IGVH hypermutation assay, CLL FISH panel. I will call her with results of the flow study once it's available.

## 2023-08-10 LAB
LDH SERPL-CCNC: 243 U/L
URATE SERPL-MCNC: 5.2 MG/DL

## 2023-08-11 ENCOUNTER — RESULT REVIEW (OUTPATIENT)
Age: 63
End: 2023-08-11

## 2023-08-11 ENCOUNTER — OUTPATIENT (OUTPATIENT)
Dept: OUTPATIENT SERVICES | Facility: HOSPITAL | Age: 63
LOS: 1 days | End: 2023-08-11

## 2023-08-11 ENCOUNTER — APPOINTMENT (OUTPATIENT)
Dept: ULTRASOUND IMAGING | Facility: IMAGING CENTER | Age: 63
End: 2023-08-11
Payer: COMMERCIAL

## 2023-08-11 ENCOUNTER — NON-APPOINTMENT (OUTPATIENT)
Age: 63
End: 2023-08-11

## 2023-08-11 ENCOUNTER — APPOINTMENT (OUTPATIENT)
Dept: RADIOLOGY | Facility: IMAGING CENTER | Age: 63
End: 2023-08-11
Payer: COMMERCIAL

## 2023-08-11 ENCOUNTER — OUTPATIENT (OUTPATIENT)
Dept: OUTPATIENT SERVICES | Facility: HOSPITAL | Age: 63
LOS: 1 days | End: 2023-08-11
Payer: COMMERCIAL

## 2023-08-11 ENCOUNTER — APPOINTMENT (OUTPATIENT)
Dept: MAMMOGRAPHY | Facility: IMAGING CENTER | Age: 63
End: 2023-08-11
Payer: COMMERCIAL

## 2023-08-11 DIAGNOSIS — Z00.8 ENCOUNTER FOR OTHER GENERAL EXAMINATION: ICD-10-CM

## 2023-08-11 DIAGNOSIS — Z12.9 ENCOUNTER FOR SCREENING FOR MALIGNANT NEOPLASM, SITE UNSPECIFIED: ICD-10-CM

## 2023-08-11 LAB
EBV DNA SERPL NAA+PROBE-ACNC: NOT DETECTED IU/ML
EBVPCR LOG: NOT DETECTED LOG10IU/ML
HBV CORE IGG+IGM SER QL: NONREACTIVE
HBV SURFACE AB SER QL: NONREACTIVE
HBV SURFACE AG SER QL: NONREACTIVE
HCV AB SER QL: NONREACTIVE
HCV S/CO RATIO: 0.14 S/CO
HIV1+2 AB SPEC QL IA.RAPID: NONREACTIVE

## 2023-08-11 PROCEDURE — 77067 SCR MAMMO BI INCL CAD: CPT | Mod: 26

## 2023-08-11 PROCEDURE — 76641 ULTRASOUND BREAST COMPLETE: CPT | Mod: 26,50

## 2023-08-11 PROCEDURE — 77080 DXA BONE DENSITY AXIAL: CPT

## 2023-08-11 PROCEDURE — 77063 BREAST TOMOSYNTHESIS BI: CPT | Mod: 26

## 2023-08-11 PROCEDURE — 77063 BREAST TOMOSYNTHESIS BI: CPT

## 2023-08-11 PROCEDURE — 76641 ULTRASOUND BREAST COMPLETE: CPT

## 2023-08-11 PROCEDURE — 77085 DXA BONE DENSITY AXL VRT FX: CPT | Mod: 26

## 2023-08-11 PROCEDURE — 77067 SCR MAMMO BI INCL CAD: CPT

## 2023-08-11 PROCEDURE — 77085 DXA BONE DENSITY AXL VRT FX: CPT

## 2023-08-29 ENCOUNTER — APPOINTMENT (OUTPATIENT)
Dept: MAMMOGRAPHY | Facility: IMAGING CENTER | Age: 63
End: 2023-08-29
Payer: COMMERCIAL

## 2023-08-29 ENCOUNTER — RESULT REVIEW (OUTPATIENT)
Age: 63
End: 2023-08-29

## 2023-08-29 ENCOUNTER — OUTPATIENT (OUTPATIENT)
Dept: OUTPATIENT SERVICES | Facility: HOSPITAL | Age: 63
LOS: 1 days | End: 2023-08-29
Payer: COMMERCIAL

## 2023-08-29 ENCOUNTER — APPOINTMENT (OUTPATIENT)
Dept: ULTRASOUND IMAGING | Facility: IMAGING CENTER | Age: 63
End: 2023-08-29
Payer: COMMERCIAL

## 2023-08-29 DIAGNOSIS — Z00.8 ENCOUNTER FOR OTHER GENERAL EXAMINATION: ICD-10-CM

## 2023-08-29 PROCEDURE — 77065 DX MAMMO INCL CAD UNI: CPT | Mod: 26,LT

## 2023-08-29 PROCEDURE — 76642 ULTRASOUND BREAST LIMITED: CPT | Mod: 26,50

## 2023-08-29 PROCEDURE — 76642 ULTRASOUND BREAST LIMITED: CPT

## 2023-08-29 PROCEDURE — G0279: CPT | Mod: 26

## 2023-08-29 PROCEDURE — G0279: CPT

## 2023-08-29 PROCEDURE — 77065 DX MAMMO INCL CAD UNI: CPT

## 2023-09-18 ENCOUNTER — APPOINTMENT (OUTPATIENT)
Dept: ORTHOPEDIC SURGERY | Facility: CLINIC | Age: 63
End: 2023-09-18
Payer: COMMERCIAL

## 2023-09-18 VITALS — BODY MASS INDEX: 33.96 KG/M2 | WEIGHT: 173 LBS | HEIGHT: 60 IN

## 2023-09-18 DIAGNOSIS — M65.4 RADIAL STYLOID TENOSYNOVITIS [DE QUERVAIN]: ICD-10-CM

## 2023-09-18 PROCEDURE — 99214 OFFICE O/P EST MOD 30 MIN: CPT | Mod: 25

## 2023-09-18 PROCEDURE — 73110 X-RAY EXAM OF WRIST: CPT | Mod: RT

## 2023-09-18 PROCEDURE — 20550 NJX 1 TENDON SHEATH/LIGAMENT: CPT

## 2023-10-19 ENCOUNTER — NON-APPOINTMENT (OUTPATIENT)
Age: 63
End: 2023-10-19

## 2023-10-20 ENCOUNTER — APPOINTMENT (OUTPATIENT)
Dept: INTERNAL MEDICINE | Facility: CLINIC | Age: 63
End: 2023-10-20
Payer: COMMERCIAL

## 2023-10-20 ENCOUNTER — LABORATORY RESULT (OUTPATIENT)
Age: 63
End: 2023-10-20

## 2023-10-20 VITALS
DIASTOLIC BLOOD PRESSURE: 80 MMHG | HEART RATE: 68 BPM | SYSTOLIC BLOOD PRESSURE: 142 MMHG | OXYGEN SATURATION: 97 % | HEIGHT: 60 IN | WEIGHT: 175 LBS | TEMPERATURE: 98.2 F | BODY MASS INDEX: 34.36 KG/M2

## 2023-10-20 VITALS — DIASTOLIC BLOOD PRESSURE: 86 MMHG | SYSTOLIC BLOOD PRESSURE: 120 MMHG

## 2023-10-20 DIAGNOSIS — S06.9XAA UNSPECIFIED INTRACRANIAL INJURY WITH LOSS OF CONSCIOUSNESS STATUS UNKNOWN, INITIAL ENCOUNTER: ICD-10-CM

## 2023-10-20 DIAGNOSIS — R93.1 ABNORMAL FINDINGS ON DIAGNOSTIC IMAGING OF HEART AND CORONARY CIRCULATION: ICD-10-CM

## 2023-10-20 DIAGNOSIS — I10 ESSENTIAL (PRIMARY) HYPERTENSION: ICD-10-CM

## 2023-10-20 DIAGNOSIS — M85.80 OTHER SPECIFIED DISORDERS OF BONE DENSITY AND STRUCTURE, UNSPECIFIED SITE: ICD-10-CM

## 2023-10-20 DIAGNOSIS — E78.5 HYPERLIPIDEMIA, UNSPECIFIED: ICD-10-CM

## 2023-10-20 PROCEDURE — 99214 OFFICE O/P EST MOD 30 MIN: CPT

## 2023-10-22 PROBLEM — S06.9XAA TBI (TRAUMATIC BRAIN INJURY): Status: ACTIVE | Noted: 2021-11-17

## 2023-10-22 PROBLEM — E78.5 HLD (HYPERLIPIDEMIA): Status: ACTIVE | Noted: 2023-07-24

## 2023-10-22 PROBLEM — I10 HYPERTENSION, UNSPECIFIED TYPE: Status: ACTIVE | Noted: 2020-08-27

## 2023-10-22 PROBLEM — M85.80 OSTEOPENIA: Status: ACTIVE | Noted: 2023-10-20

## 2023-10-22 PROBLEM — R93.1 ELEVATED CORONARY ARTERY CALCIUM SCORE: Status: ACTIVE | Noted: 2023-07-28

## 2023-10-23 LAB
ALBUMIN SERPL ELPH-MCNC: 4.3 G/DL
ALP BLD-CCNC: 90 U/L
ALT SERPL-CCNC: 19 U/L
ANION GAP SERPL CALC-SCNC: 10 MMOL/L
AST SERPL-CCNC: 25 U/L
BASOPHILS # BLD AUTO: 0.12 K/UL
BASOPHILS NFR BLD AUTO: 0.4 %
BILIRUB SERPL-MCNC: 0.2 MG/DL
BUN SERPL-MCNC: 22 MG/DL
CALCIUM SERPL-MCNC: 9.7 MG/DL
CHLORIDE SERPL-SCNC: 106 MMOL/L
CHOLEST SERPL-MCNC: 223 MG/DL
CK SERPL-CCNC: 52 U/L
CO2 SERPL-SCNC: 26 MMOL/L
CREAT SERPL-MCNC: 0.71 MG/DL
EGFR: 97 ML/MIN/1.73M2
EOSINOPHIL # BLD AUTO: 0.54 K/UL
EOSINOPHIL NFR BLD AUTO: 2 %
ESTIMATED AVERAGE GLUCOSE: 148 MG/DL
GLUCOSE SERPL-MCNC: 101 MG/DL
HBA1C MFR BLD HPLC: 6.8 %
HCT VFR BLD CALC: 42.1 %
HDLC SERPL-MCNC: 49 MG/DL
HGB BLD-MCNC: 13.1 G/DL
IMM GRANULOCYTES NFR BLD AUTO: 0.1 %
LDLC SERPL CALC-MCNC: 156 MG/DL
LYMPHOCYTES # BLD AUTO: 19.81 K/UL
LYMPHOCYTES NFR BLD AUTO: 73 %
MAN DIFF?: NORMAL
MCHC RBC-ENTMCNC: 29.4 PG
MCHC RBC-ENTMCNC: 31.1 GM/DL
MCV RBC AUTO: 94.4 FL
MONOCYTES # BLD AUTO: 0.83 K/UL
MONOCYTES NFR BLD AUTO: 3.1 %
NEUTROPHILS # BLD AUTO: 5.81 K/UL
NEUTROPHILS NFR BLD AUTO: 21.4 %
NONHDLC SERPL-MCNC: 174 MG/DL
PLATELET # BLD AUTO: 156 K/UL
POTASSIUM SERPL-SCNC: 4.7 MMOL/L
PROT SERPL-MCNC: 7.7 G/DL
RBC # BLD: 4.46 M/UL
RBC # FLD: 13 %
SODIUM SERPL-SCNC: 141 MMOL/L
T4 FREE SERPL-MCNC: 1 NG/DL
TRIGL SERPL-MCNC: 103 MG/DL
TSH SERPL-ACNC: 1.62 UIU/ML
WBC # FLD AUTO: 27.15 K/UL

## 2023-10-30 NOTE — REASON FOR VISIT
Detail Level: Detailed Add 54934 Cpt? (Important Note: In 2017 The Use Of 35165 Is Being Tracked By Cms To Determine Future Global Period Reimbursement For Global Periods): yes Wound Evaluated By: Jonas [Follow-Up: _____] : a [unfilled] follow-up visit

## 2023-11-02 ENCOUNTER — OUTPATIENT (OUTPATIENT)
Dept: OUTPATIENT SERVICES | Facility: HOSPITAL | Age: 63
LOS: 1 days | Discharge: HOME | End: 2023-11-02

## 2023-11-02 DIAGNOSIS — Z12.89 ENCOUNTER FOR SCREENING FOR MALIGNANT NEOPLASM OF OTHER SITES: ICD-10-CM

## 2023-11-03 ENCOUNTER — RX RENEWAL (OUTPATIENT)
Age: 63
End: 2023-11-03

## 2023-11-13 ENCOUNTER — RESULT REVIEW (OUTPATIENT)
Age: 63
End: 2023-11-13

## 2023-11-13 ENCOUNTER — APPOINTMENT (OUTPATIENT)
Dept: HEMATOLOGY ONCOLOGY | Facility: CLINIC | Age: 63
End: 2023-11-13
Payer: COMMERCIAL

## 2023-11-13 ENCOUNTER — LABORATORY RESULT (OUTPATIENT)
Age: 63
End: 2023-11-13

## 2023-11-13 VITALS
TEMPERATURE: 98.2 F | HEART RATE: 66 BPM | OXYGEN SATURATION: 96 % | WEIGHT: 171.74 LBS | RESPIRATION RATE: 19 BRPM | DIASTOLIC BLOOD PRESSURE: 79 MMHG | SYSTOLIC BLOOD PRESSURE: 128 MMHG | BODY MASS INDEX: 33.54 KG/M2

## 2023-11-13 DIAGNOSIS — E11.9 TYPE 2 DIABETES MELLITUS W/OUT COMPLICATIONS: ICD-10-CM

## 2023-11-13 DIAGNOSIS — E66.9 OBESITY, UNSPECIFIED: ICD-10-CM

## 2023-11-13 LAB
BASOPHILS # BLD AUTO: 0 K/UL — SIGNIFICANT CHANGE UP (ref 0–0.2)
BASOPHILS # BLD AUTO: 0 K/UL — SIGNIFICANT CHANGE UP (ref 0–0.2)
BASOPHILS NFR BLD AUTO: 0 % — SIGNIFICANT CHANGE UP (ref 0–2)
BASOPHILS NFR BLD AUTO: 0 % — SIGNIFICANT CHANGE UP (ref 0–2)
EOSINOPHIL # BLD AUTO: 0.33 K/UL — SIGNIFICANT CHANGE UP (ref 0–0.5)
EOSINOPHIL # BLD AUTO: 0.33 K/UL — SIGNIFICANT CHANGE UP (ref 0–0.5)
EOSINOPHIL NFR BLD AUTO: 1.5 % — SIGNIFICANT CHANGE UP (ref 0–6)
EOSINOPHIL NFR BLD AUTO: 1.5 % — SIGNIFICANT CHANGE UP (ref 0–6)
HCT VFR BLD CALC: 39.5 % — SIGNIFICANT CHANGE UP (ref 34.5–45)
HCT VFR BLD CALC: 39.5 % — SIGNIFICANT CHANGE UP (ref 34.5–45)
HGB BLD-MCNC: 12.7 G/DL — SIGNIFICANT CHANGE UP (ref 11.5–15.5)
HGB BLD-MCNC: 12.7 G/DL — SIGNIFICANT CHANGE UP (ref 11.5–15.5)
LYMPHOCYTES # BLD AUTO: 15.76 K/UL — HIGH (ref 1–3.3)
LYMPHOCYTES # BLD AUTO: 15.76 K/UL — HIGH (ref 1–3.3)
LYMPHOCYTES # BLD AUTO: 71 % — HIGH (ref 13–44)
LYMPHOCYTES # BLD AUTO: 71 % — HIGH (ref 13–44)
MCHC RBC-ENTMCNC: 30.3 PG — SIGNIFICANT CHANGE UP (ref 27–34)
MCHC RBC-ENTMCNC: 30.3 PG — SIGNIFICANT CHANGE UP (ref 27–34)
MCHC RBC-ENTMCNC: 32.2 G/DL — SIGNIFICANT CHANGE UP (ref 32–36)
MCHC RBC-ENTMCNC: 32.2 G/DL — SIGNIFICANT CHANGE UP (ref 32–36)
MCV RBC AUTO: 94.3 FL — SIGNIFICANT CHANGE UP (ref 80–100)
MCV RBC AUTO: 94.3 FL — SIGNIFICANT CHANGE UP (ref 80–100)
MONOCYTES # BLD AUTO: 0.44 K/UL — SIGNIFICANT CHANGE UP (ref 0–0.9)
MONOCYTES # BLD AUTO: 0.44 K/UL — SIGNIFICANT CHANGE UP (ref 0–0.9)
MONOCYTES NFR BLD AUTO: 2 % — SIGNIFICANT CHANGE UP (ref 2–14)
MONOCYTES NFR BLD AUTO: 2 % — SIGNIFICANT CHANGE UP (ref 2–14)
NEUTROPHILS # BLD AUTO: 5.66 K/UL — SIGNIFICANT CHANGE UP (ref 1.8–7.4)
NEUTROPHILS # BLD AUTO: 5.66 K/UL — SIGNIFICANT CHANGE UP (ref 1.8–7.4)
NEUTROPHILS NFR BLD AUTO: 25.5 % — LOW (ref 43–77)
NEUTROPHILS NFR BLD AUTO: 25.5 % — LOW (ref 43–77)
NRBC # BLD: 0 /100 — SIGNIFICANT CHANGE UP (ref 0–0)
NRBC # BLD: 0 /100 — SIGNIFICANT CHANGE UP (ref 0–0)
NRBC # BLD: SIGNIFICANT CHANGE UP /100 WBCS (ref 0–0)
NRBC # BLD: SIGNIFICANT CHANGE UP /100 WBCS (ref 0–0)
PLAT MORPH BLD: NORMAL — SIGNIFICANT CHANGE UP
PLAT MORPH BLD: NORMAL — SIGNIFICANT CHANGE UP
PLATELET # BLD AUTO: 135 K/UL — LOW (ref 150–400)
PLATELET # BLD AUTO: 135 K/UL — LOW (ref 150–400)
RBC # BLD: 4.19 M/UL — SIGNIFICANT CHANGE UP (ref 3.8–5.2)
RBC # BLD: 4.19 M/UL — SIGNIFICANT CHANGE UP (ref 3.8–5.2)
RBC # FLD: 12.6 % — SIGNIFICANT CHANGE UP (ref 10.3–14.5)
RBC # FLD: 12.6 % — SIGNIFICANT CHANGE UP (ref 10.3–14.5)
RBC BLD AUTO: SIGNIFICANT CHANGE UP
RBC BLD AUTO: SIGNIFICANT CHANGE UP
SMUDGE CELLS # BLD: PRESENT — SIGNIFICANT CHANGE UP
SMUDGE CELLS # BLD: PRESENT — SIGNIFICANT CHANGE UP
WBC # BLD: 22.2 K/UL — HIGH (ref 3.8–10.5)
WBC # BLD: 22.2 K/UL — HIGH (ref 3.8–10.5)
WBC # FLD AUTO: 22.2 K/UL — HIGH (ref 3.8–10.5)
WBC # FLD AUTO: 22.2 K/UL — HIGH (ref 3.8–10.5)

## 2023-11-13 PROCEDURE — 99214 OFFICE O/P EST MOD 30 MIN: CPT

## 2023-11-27 LAB
ALBUMIN MFR SERPL ELPH: 56.2 %
ALBUMIN SERPL ELPH-MCNC: 4.1 G/DL
ALBUMIN SERPL-MCNC: 4.1 G/DL
ALBUMIN/GLOB SERPL: 1.3 RATIO
ALP BLD-CCNC: 82 U/L
ALPHA1 GLOB MFR SERPL ELPH: 4 %
ALPHA1 GLOB SERPL ELPH-MCNC: 0.3 G/DL
ALPHA2 GLOB MFR SERPL ELPH: 11 %
ALPHA2 GLOB SERPL ELPH-MCNC: 0.8 G/DL
ALT SERPL-CCNC: 14 U/L
ANION GAP SERPL CALC-SCNC: 10 MMOL/L
AST SERPL-CCNC: 23 U/L
B-GLOBULIN MFR SERPL ELPH: 12 %
B-GLOBULIN SERPL ELPH-MCNC: 0.9 G/DL
BILIRUB SERPL-MCNC: 0.3 MG/DL
BUN SERPL-MCNC: 16 MG/DL
CALCIUM SERPL-MCNC: 9.3 MG/DL
CHLORIDE SERPL-SCNC: 105 MMOL/L
CO2 SERPL-SCNC: 27 MMOL/L
CREAT SERPL-MCNC: 0.73 MG/DL
DEPRECATED KAPPA LC FREE/LAMBDA SER: 1.53 RATIO
EBV DNA SERPL NAA+PROBE-ACNC: NOT DETECTED IU/ML
EBVPCR LOG: NOT DETECTED LOG10IU/ML
EGFR: 94 ML/MIN/1.73M2
FERRITIN SERPL-MCNC: 118 NG/ML
GAMMA GLOB FLD ELPH-MCNC: 1.2 G/DL
GAMMA GLOB MFR SERPL ELPH: 16.8 %
GLUCOSE SERPL-MCNC: 182 MG/DL
IGA SER QL IEP: 245 MG/DL
IGG SER QL IEP: 1221 MG/DL
IGM SER QL IEP: 140 MG/DL
IGVH FAMILY: NORMAL
IGVH MUTATION ANALYSIS: NEGATIVE
INTERPRETATION SERPL IEP-IMP: NORMAL
IRON SATN MFR SERPL: 25 %
IRON SERPL-MCNC: 78 UG/DL
KAPPA LC CSF-MCNC: 1.91 MG/DL
KAPPA LC SERPL-MCNC: 2.93 MG/DL
LDH SERPL-CCNC: 227 U/L
M PROTEIN MFR SERPL ELPH: NORMAL
M PROTEIN SPEC IFE-MCNC: NORMAL
MONOCLON BAND OBS SERPL: NORMAL
MUTATION RATE: NORMAL
POTASSIUM SERPL-SCNC: 4.2 MMOL/L
PROT SERPL-MCNC: 6.8 G/DL
PROT SERPL-MCNC: 7.3 G/DL
PROT SERPL-MCNC: 7.3 G/DL
SODIUM SERPL-SCNC: 142 MMOL/L
TIBC SERPL-MCNC: 312 UG/DL
UIBC SERPL-MCNC: 233 UG/DL
URATE SERPL-MCNC: 5.2 MG/DL

## 2023-12-27 ENCOUNTER — RX RENEWAL (OUTPATIENT)
Age: 63
End: 2023-12-27

## 2023-12-27 RX ORDER — LOSARTAN POTASSIUM 50 MG/1
50 TABLET, FILM COATED ORAL DAILY
Qty: 90 | Refills: 1 | Status: ACTIVE | COMMUNITY
Start: 1900-01-01 | End: 1900-01-01

## 2024-03-07 ENCOUNTER — OUTPATIENT (OUTPATIENT)
Dept: OUTPATIENT SERVICES | Facility: HOSPITAL | Age: 64
LOS: 1 days | Discharge: HOME | End: 2024-03-07

## 2024-03-07 DIAGNOSIS — Z12.89 ENCOUNTER FOR SCREENING FOR MALIGNANT NEOPLASM OF OTHER SITES: ICD-10-CM

## 2024-03-11 ENCOUNTER — APPOINTMENT (OUTPATIENT)
Dept: HEMATOLOGY ONCOLOGY | Facility: CLINIC | Age: 64
End: 2024-03-11

## 2024-03-12 ENCOUNTER — RESULT REVIEW (OUTPATIENT)
Age: 64
End: 2024-03-12

## 2024-03-12 ENCOUNTER — APPOINTMENT (OUTPATIENT)
Dept: HEMATOLOGY ONCOLOGY | Facility: CLINIC | Age: 64
End: 2024-03-12
Payer: COMMERCIAL

## 2024-03-12 VITALS
SYSTOLIC BLOOD PRESSURE: 128 MMHG | RESPIRATION RATE: 16 BRPM | BODY MASS INDEX: 34.19 KG/M2 | OXYGEN SATURATION: 96 % | DIASTOLIC BLOOD PRESSURE: 86 MMHG | HEART RATE: 72 BPM | TEMPERATURE: 97.2 F | WEIGHT: 175.05 LBS

## 2024-03-12 DIAGNOSIS — Z86.79 PERSONAL HISTORY OF OTHER DISEASES OF THE CIRCULATORY SYSTEM: ICD-10-CM

## 2024-03-12 DIAGNOSIS — K51.90 ULCERATIVE COLITIS, UNSPECIFIED, W/OUT COMPLICATIONS: ICD-10-CM

## 2024-03-12 DIAGNOSIS — C91.10 CHRONIC LYMPHOCYTIC LEUKEMIA OF B-CELL TYPE NOT HAVING ACHIEVED REMISSION: ICD-10-CM

## 2024-03-12 DIAGNOSIS — R20.2 PARESTHESIA OF SKIN: ICD-10-CM

## 2024-03-12 LAB
ALBUMIN SERPL ELPH-MCNC: 4.3 G/DL
ALP BLD-CCNC: 83 U/L
ALT SERPL-CCNC: 13 U/L
ANION GAP SERPL CALC-SCNC: 10 MMOL/L
AST SERPL-CCNC: 21 U/L
BASOPHILS # BLD AUTO: 0 K/UL — SIGNIFICANT CHANGE UP (ref 0–0.2)
BASOPHILS NFR BLD AUTO: 0 % — SIGNIFICANT CHANGE UP (ref 0–2)
BILIRUB SERPL-MCNC: 0.3 MG/DL
BUN SERPL-MCNC: 12 MG/DL
CALCIUM SERPL-MCNC: 9.3 MG/DL
CHLORIDE SERPL-SCNC: 107 MMOL/L
CO2 SERPL-SCNC: 28 MMOL/L
CREAT SERPL-MCNC: 0.59 MG/DL
DEPRECATED KAPPA LC FREE/LAMBDA SER: 1.23 RATIO
EGFR: 102 ML/MIN/1.73M2
EOSINOPHIL # BLD AUTO: 1.03 K/UL — HIGH (ref 0–0.5)
EOSINOPHIL NFR BLD AUTO: 4 % — SIGNIFICANT CHANGE UP (ref 0–6)
GLUCOSE SERPL-MCNC: 139 MG/DL
HCT VFR BLD CALC: 39.7 % — SIGNIFICANT CHANGE UP (ref 34.5–45)
HGB BLD-MCNC: 12.7 G/DL — SIGNIFICANT CHANGE UP (ref 11.5–15.5)
IGA SER QL IEP: 256 MG/DL
IGG SER QL IEP: 1194 MG/DL
IGM SER QL IEP: 123 MG/DL
KAPPA LC CSF-MCNC: 2.48 MG/DL
KAPPA LC SERPL-MCNC: 3.05 MG/DL
LDH SERPL-CCNC: 225 U/L
LYMPHOCYTES # BLD AUTO: 19.87 K/UL — HIGH (ref 1–3.3)
LYMPHOCYTES # BLD AUTO: 77 % — HIGH (ref 13–44)
MCHC RBC-ENTMCNC: 30 PG — SIGNIFICANT CHANGE UP (ref 27–34)
MCHC RBC-ENTMCNC: 32 G/DL — SIGNIFICANT CHANGE UP (ref 32–36)
MCV RBC AUTO: 93.6 FL — SIGNIFICANT CHANGE UP (ref 80–100)
MONOCYTES # BLD AUTO: 0.77 K/UL — SIGNIFICANT CHANGE UP (ref 0–0.9)
MONOCYTES NFR BLD AUTO: 3 % — SIGNIFICANT CHANGE UP (ref 2–14)
NEUTROPHILS # BLD AUTO: 4.13 K/UL — SIGNIFICANT CHANGE UP (ref 1.8–7.4)
NEUTROPHILS NFR BLD AUTO: 16 % — LOW (ref 43–77)
NRBC # BLD: 0 /100 WBCS — SIGNIFICANT CHANGE UP (ref 0–0)
NRBC # BLD: SIGNIFICANT CHANGE UP /100 WBCS (ref 0–0)
PLAT MORPH BLD: NORMAL — SIGNIFICANT CHANGE UP
PLATELET # BLD AUTO: 141 K/UL — LOW (ref 150–400)
POTASSIUM SERPL-SCNC: 4.3 MMOL/L
PROT SERPL-MCNC: 7.1 G/DL
RBC # BLD: 4.24 M/UL — SIGNIFICANT CHANGE UP (ref 3.8–5.2)
RBC # FLD: 12.8 % — SIGNIFICANT CHANGE UP (ref 10.3–14.5)
RBC BLD AUTO: SIGNIFICANT CHANGE UP
SMUDGE CELLS # BLD: PRESENT — SIGNIFICANT CHANGE UP
SODIUM SERPL-SCNC: 145 MMOL/L
URATE SERPL-MCNC: 5.1 MG/DL
WBC # BLD: 25.8 K/UL — HIGH (ref 3.8–10.5)
WBC # FLD AUTO: 25.8 K/UL — HIGH (ref 3.8–10.5)

## 2024-03-12 PROCEDURE — G2211 COMPLEX E/M VISIT ADD ON: CPT

## 2024-03-12 PROCEDURE — 99214 OFFICE O/P EST MOD 30 MIN: CPT
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
## 2024-03-12 NOTE — CONSULT LETTER
[DrJulien  ___] : Dr. ELDER [FreeTextEntry3] : Iker Escobar M.D., [facility]

## 2024-03-12 NOTE — REVIEW OF SYSTEMS
[Fatigue] : fatigue [Joint Pain] : joint pain [Muscle Weakness] : muscle weakness [Negative] : Allergic/Immunologic [Fever] : no fever [Night Sweats] : no night sweats [Recent Change In Weight] : ~T no recent weight change [Abdominal Pain] : no abdominal pain [Muscle Pain] : no muscle pain [Dizziness] : no dizziness [Fainting] : no fainting [FreeTextEntry7] : UC controlled [de-identified] : numbness - thighs, as above

## 2024-03-12 NOTE — ASSESSMENT
[Palliative] : Goals of care discussed with patient: Palliative [Palliative Care Plan] : not applicable at this time [FreeTextEntry1] : 63 yo woman with stage I  CLL. She has unfavorable prognostic indicators: P53 mutation and IGVH unmut CD38 neg: favorable Hgb, plt have been low normal-normal. She has UC controlled with anti-TNF agent.   CBC results reviewed and d/w pt: WBC 25.80, Hgb 12.7, Plt 141K, , ALC 19.87 Overall, no significant change since 7/2023  Plt sl increased vs last visit  has small M spike, to be followed does not have hypogammaglobulinemia  c/o numbness thighs - check x-rays hips, L/S spine  consider MRI, neuro eval if persists  check CMP, LDH, uric acid, Igs still need to have the other P53 allele sequenced  to review with PCP vaccination stastus, beltran. Pneumovax and Shingrix; had 2023-24 flu vaccine.  RV 4  mos

## 2024-03-12 NOTE — PHYSICAL EXAM
[Fully active, able to carry on all pre-disease performance without restriction] : Status 0 - Fully active, able to carry on all pre-disease performance without restriction [Normal] : affect appropriate [de-identified] : shotty neck nodes

## 2024-03-15 NOTE — ED PROCEDURE NOTE - ATTENDING CONTRIBUTION TO CARE
***Moe Leonard MD FACEP*** Attending physician was available for the key components of the procedure, the patient tolerated well. There were no complications with the procedure.
Speaking Coherently/Age appropriate

## 2024-03-21 ENCOUNTER — APPOINTMENT (OUTPATIENT)
Dept: ULTRASOUND IMAGING | Facility: IMAGING CENTER | Age: 64
End: 2024-03-21

## 2024-04-01 ENCOUNTER — OUTPATIENT (OUTPATIENT)
Dept: OUTPATIENT SERVICES | Facility: HOSPITAL | Age: 64
LOS: 1 days | End: 2024-04-01
Payer: COMMERCIAL

## 2024-04-01 ENCOUNTER — APPOINTMENT (OUTPATIENT)
Dept: RADIOLOGY | Facility: IMAGING CENTER | Age: 64
End: 2024-04-01
Payer: COMMERCIAL

## 2024-04-01 DIAGNOSIS — R20.2 PARESTHESIA OF SKIN: ICD-10-CM

## 2024-04-01 PROCEDURE — 72100 X-RAY EXAM L-S SPINE 2/3 VWS: CPT | Mod: 26

## 2024-04-01 PROCEDURE — 72100 X-RAY EXAM L-S SPINE 2/3 VWS: CPT

## 2024-05-02 NOTE — ED STATDOCS - DR. NAME
Nephrology Referral   Call [phone] to schedule an appointment with a specialist     [facility]  Labs were ordered during today's visit. It is important that they be completed. A physician will call you with the results once they are available.      The laboratory can be accessed through the [facility] entrance. Park in [address] and enter the building through the main entrance on [facility] (blue awning with cartoon animals on the windows). No appointment is necessary for lab work.      Please get labs done as soon as possible:  The lab is located at [facility] at [address].  Lab hours are:  Monday-Friday: 7 am until 6 pm  Saturday: 7 am until 1 pm    
Kami Dallas (DO)

## 2024-05-14 ENCOUNTER — APPOINTMENT (OUTPATIENT)
Dept: ULTRASOUND IMAGING | Facility: IMAGING CENTER | Age: 64
End: 2024-05-14
Payer: COMMERCIAL

## 2024-05-14 ENCOUNTER — OUTPATIENT (OUTPATIENT)
Dept: OUTPATIENT SERVICES | Facility: HOSPITAL | Age: 64
LOS: 1 days | End: 2024-05-14
Payer: COMMERCIAL

## 2024-05-14 ENCOUNTER — RESULT REVIEW (OUTPATIENT)
Age: 64
End: 2024-05-14

## 2024-05-14 ENCOUNTER — APPOINTMENT (OUTPATIENT)
Dept: MAMMOGRAPHY | Facility: IMAGING CENTER | Age: 64
End: 2024-05-14
Payer: COMMERCIAL

## 2024-05-14 DIAGNOSIS — R92.8 OTHER ABNORMAL AND INCONCLUSIVE FINDINGS ON DIAGNOSTIC IMAGING OF BREAST: ICD-10-CM

## 2024-05-14 PROCEDURE — 76641 ULTRASOUND BREAST COMPLETE: CPT | Mod: 26,50

## 2024-05-14 PROCEDURE — 76641 ULTRASOUND BREAST COMPLETE: CPT

## 2024-05-31 ENCOUNTER — APPOINTMENT (OUTPATIENT)
Dept: SURGICAL ONCOLOGY | Facility: CLINIC | Age: 64
End: 2024-05-31
Payer: COMMERCIAL

## 2024-05-31 VITALS
HEART RATE: 75 BPM | SYSTOLIC BLOOD PRESSURE: 130 MMHG | DIASTOLIC BLOOD PRESSURE: 80 MMHG | WEIGHT: 174 LBS | BODY MASS INDEX: 34.16 KG/M2 | HEIGHT: 60 IN | OXYGEN SATURATION: 97 %

## 2024-05-31 DIAGNOSIS — N63.20 UNSPECIFIED LUMP IN THE LEFT BREAST, UNSPECIFIED QUADRANT: ICD-10-CM

## 2024-05-31 DIAGNOSIS — N63.10 UNSPECIFIED LUMP IN THE RIGHT BREAST, UNSPECIFIED QUADRANT: ICD-10-CM

## 2024-05-31 PROCEDURE — 99244 OFF/OP CNSLTJ NEW/EST MOD 40: CPT

## 2024-05-31 NOTE — PHYSICAL EXAM
[Normal] : supple, no neck mass and thyroid not enlarged [Normal Neck Lymph Nodes] : normal neck lymph nodes  [Normal Supraclavicular Lymph Nodes] : normal supraclavicular lymph nodes [Normal Groin Lymph Nodes] : normal groin lymph nodes [Normal Axillary Lymph Nodes] : normal axillary lymph nodes [Normal] : oriented to person, place and time, with appropriate affect [de-identified] : no masses or adenopathy bilaterally. left breast lumpectomy scar well healed. tenderness over bilateral costochondral junctions.

## 2024-05-31 NOTE — ASSESSMENT
[FreeTextEntry1] : Left breast 4:00 mass stable on imaging - BIRADS 3 US 5/2024 Reassured patient that index of suspicion for malignancy is low  Breast pain likely costochondritis - recc Motrin or Advil prn  Continue yearly breast imaging 8/2024 If negative imaging, RTO prn

## 2024-05-31 NOTE — ADDENDUM
[FreeTextEntry1] : I, June Palacio, acted solely as a scribe for Dr. Andre Grant on this date 05/31/2024.

## 2024-05-31 NOTE — CONSULT LETTER
[Dear  ___] : Dear  [unfilled], [Consult Letter:] : I had the pleasure of evaluating your patient, [unfilled]. [Please see my note below.] : Please see my note below. [Sincerely,] : Sincerely, [FreeTextEntry3] : Andre Grant MD FACS

## 2024-05-31 NOTE — HISTORY OF PRESENT ILLNESS
[de-identified] : Patient is a 62 y/o F who presents an initial consultation for a left breast mass seen on imaging that is stable. Referred by Dr. Bladimir Ramirez.   B/L US 5/14/24- Stable probably benign findings bilaterally. L breast 4:00 6 cmFN a hypoechoic mass is noted measuring 5 x 2 x 3 mm, stable. Recommend continued follow-up in August 2024 at the time of the patient's next mammogram to confirm one-year stability. BIRADS 3  L mammo/sono 8/29/24- 1. Dense breast. 2. No radiographically suspicious calcifications are seen in the left breast by mammography at this time. 3. Multiple enlarged abnormal appearing lymph nodes in both axillae by sonography, corresponding to the mammographic adenopathy. The patient reports a history of recently clinically diagnosed CLL. This likely explains these findings. This also likely explains the enlarging intramammary lymph node at 2:00 in the left breast seen by mammography and sonography. Clinical follow-up is recommended. 4. Probably benign findings by sonography at the 5:00 right breast and 4:00 (6 cm from the nipple) left breast for which follow-up bilateral targeted sonography in 6 months is recommended to confirm their stability/rule out new growth. BIRADS 3   PMHx: S/p benign right ultrasound-guided needle biopsy 10-11 years ago, benign left ultrasound-guided needle biopsy and benign right stereotactic biopsy in the past, and has also reported that she is status post benign left surgical biopsy 29+ years ago.   FMHx: family history of breast cancer in the patient's 2 maternal aunts. The patient is reported to have undergone negative breast genetic testing.

## 2024-06-13 ENCOUNTER — OUTPATIENT (OUTPATIENT)
Dept: OUTPATIENT SERVICES | Facility: HOSPITAL | Age: 64
LOS: 1 days | Discharge: HOME | End: 2024-06-13

## 2024-06-13 DIAGNOSIS — C91.10 CHRONIC LYMPHOCYTIC LEUKEMIA OF B-CELL TYPE NOT HAVING ACHIEVED REMISSION: ICD-10-CM

## 2024-06-26 RX ORDER — ATORVASTATIN CALCIUM 10 MG/1
10 TABLET, FILM COATED ORAL
Qty: 14 | Refills: 0 | Status: ACTIVE | COMMUNITY
Start: 2023-07-28 | End: 1900-01-01

## 2024-07-02 ENCOUNTER — APPOINTMENT (OUTPATIENT)
Dept: HEMATOLOGY ONCOLOGY | Facility: CLINIC | Age: 64
End: 2024-07-02

## 2024-07-02 ENCOUNTER — RESULT REVIEW (OUTPATIENT)
Age: 64
End: 2024-07-02

## 2024-07-02 ENCOUNTER — APPOINTMENT (OUTPATIENT)
Dept: HEMATOLOGY ONCOLOGY | Facility: CLINIC | Age: 64
End: 2024-07-02
Payer: COMMERCIAL

## 2024-07-02 VITALS
DIASTOLIC BLOOD PRESSURE: 88 MMHG | WEIGHT: 175.93 LBS | RESPIRATION RATE: 16 BRPM | HEART RATE: 79 BPM | TEMPERATURE: 97.1 F | OXYGEN SATURATION: 95 % | SYSTOLIC BLOOD PRESSURE: 144 MMHG | BODY MASS INDEX: 34.36 KG/M2

## 2024-07-02 DIAGNOSIS — C91.10 CHRONIC LYMPHOCYTIC LEUKEMIA OF B-CELL TYPE NOT HAVING ACHIEVED REMISSION: ICD-10-CM

## 2024-07-02 DIAGNOSIS — N63.20 UNSPECIFIED LUMP IN THE LEFT BREAST, UNSPECIFIED QUADRANT: ICD-10-CM

## 2024-07-02 DIAGNOSIS — E11.9 TYPE 2 DIABETES MELLITUS W/OUT COMPLICATIONS: ICD-10-CM

## 2024-07-02 DIAGNOSIS — Z80.3 FAMILY HISTORY OF MALIGNANT NEOPLASM OF BREAST: ICD-10-CM

## 2024-07-02 DIAGNOSIS — N63.10 UNSPECIFIED LUMP IN THE RIGHT BREAST, UNSPECIFIED QUADRANT: ICD-10-CM

## 2024-07-02 DIAGNOSIS — K51.90 ULCERATIVE COLITIS, UNSPECIFIED, W/OUT COMPLICATIONS: ICD-10-CM

## 2024-07-02 LAB
BASOPHILS # BLD AUTO: 0 K/UL — SIGNIFICANT CHANGE UP (ref 0–0.2)
BASOPHILS NFR BLD AUTO: 0 % — SIGNIFICANT CHANGE UP (ref 0–2)
EOSINOPHIL # BLD AUTO: 0.93 K/UL — HIGH (ref 0–0.5)
EOSINOPHIL NFR BLD AUTO: 3 % — SIGNIFICANT CHANGE UP (ref 0–6)
HCT VFR BLD CALC: 37.2 % — SIGNIFICANT CHANGE UP (ref 34.5–45)
HGB BLD-MCNC: 12 G/DL — SIGNIFICANT CHANGE UP (ref 11.5–15.5)
LYMPHOCYTES # BLD AUTO: 23.46 K/UL — HIGH (ref 1–3.3)
LYMPHOCYTES # BLD AUTO: 76 % — HIGH (ref 13–44)
MCHC RBC-ENTMCNC: 30.4 PG — SIGNIFICANT CHANGE UP (ref 27–34)
MCHC RBC-ENTMCNC: 32.3 G/DL — SIGNIFICANT CHANGE UP (ref 32–36)
MCV RBC AUTO: 94.2 FL — SIGNIFICANT CHANGE UP (ref 80–100)
MONOCYTES # BLD AUTO: 0.62 K/UL — SIGNIFICANT CHANGE UP (ref 0–0.9)
MONOCYTES NFR BLD AUTO: 2 % — SIGNIFICANT CHANGE UP (ref 2–14)
NEUTROPHILS # BLD AUTO: 5.87 K/UL — SIGNIFICANT CHANGE UP (ref 1.8–7.4)
NEUTROPHILS NFR BLD AUTO: 19 % — LOW (ref 43–77)
NRBC # BLD: 0 /100 WBCS — SIGNIFICANT CHANGE UP (ref 0–0)
NRBC # BLD: SIGNIFICANT CHANGE UP /100 WBCS (ref 0–0)
PLAT MORPH BLD: NORMAL — SIGNIFICANT CHANGE UP
PLATELET # BLD AUTO: 142 K/UL — LOW (ref 150–400)
RBC # BLD: 3.95 M/UL — SIGNIFICANT CHANGE UP (ref 3.8–5.2)
RBC # FLD: 12.5 % — SIGNIFICANT CHANGE UP (ref 10.3–14.5)
RBC BLD AUTO: SIGNIFICANT CHANGE UP
SMUDGE CELLS # BLD: PRESENT — SIGNIFICANT CHANGE UP
WBC # BLD: 30.87 K/UL — HIGH (ref 3.8–10.5)
WBC # FLD AUTO: 30.87 K/UL — HIGH (ref 3.8–10.5)

## 2024-07-02 PROCEDURE — 99213 OFFICE O/P EST LOW 20 MIN: CPT

## 2024-07-02 PROCEDURE — G2211 COMPLEX E/M VISIT ADD ON: CPT

## 2024-08-20 ENCOUNTER — APPOINTMENT (OUTPATIENT)
Dept: MAMMOGRAPHY | Facility: IMAGING CENTER | Age: 64
End: 2024-08-20

## 2024-08-20 ENCOUNTER — APPOINTMENT (OUTPATIENT)
Dept: ULTRASOUND IMAGING | Facility: IMAGING CENTER | Age: 64
End: 2024-08-20

## 2024-10-02 ENCOUNTER — TRANSCRIPTION ENCOUNTER (OUTPATIENT)
Age: 64
End: 2024-10-02

## 2024-10-02 ENCOUNTER — NON-APPOINTMENT (OUTPATIENT)
Age: 64
End: 2024-10-02

## 2024-10-02 ENCOUNTER — LABORATORY RESULT (OUTPATIENT)
Age: 64
End: 2024-10-02

## 2024-10-02 ENCOUNTER — APPOINTMENT (OUTPATIENT)
Dept: INTERNAL MEDICINE | Facility: CLINIC | Age: 64
End: 2024-10-02
Payer: COMMERCIAL

## 2024-10-02 VITALS
SYSTOLIC BLOOD PRESSURE: 140 MMHG | HEART RATE: 72 BPM | BODY MASS INDEX: 31.83 KG/M2 | TEMPERATURE: 97.4 F | DIASTOLIC BLOOD PRESSURE: 84 MMHG | OXYGEN SATURATION: 97 % | HEIGHT: 60 IN | WEIGHT: 162.13 LBS

## 2024-10-02 VITALS — SYSTOLIC BLOOD PRESSURE: 140 MMHG | DIASTOLIC BLOOD PRESSURE: 85 MMHG

## 2024-10-02 DIAGNOSIS — E11.9 TYPE 2 DIABETES MELLITUS W/OUT COMPLICATIONS: ICD-10-CM

## 2024-10-02 DIAGNOSIS — R92.8 OTHER ABNORMAL AND INCONCLUSIVE FINDINGS ON DIAGNOSTIC IMAGING OF BREAST: ICD-10-CM

## 2024-10-02 DIAGNOSIS — R93.1 ABNORMAL FINDINGS ON DIAGNOSTIC IMAGING OF HEART AND CORONARY CIRCULATION: ICD-10-CM

## 2024-10-02 DIAGNOSIS — Z71.85 ENCOUNTER FOR IMMUNIZATION SAFETY COUNSELING: ICD-10-CM

## 2024-10-02 DIAGNOSIS — Z86.79 PERSONAL HISTORY OF OTHER DISEASES OF THE CIRCULATORY SYSTEM: ICD-10-CM

## 2024-10-02 DIAGNOSIS — S06.9XAA UNSPECIFIED INTRACRANIAL INJURY WITH LOSS OF CONSCIOUSNESS STATUS UNKNOWN, INITIAL ENCOUNTER: ICD-10-CM

## 2024-10-02 DIAGNOSIS — Z13.228 ENCOUNTER FOR SCREENING FOR OTHER METABOLIC DISORDERS: ICD-10-CM

## 2024-10-02 DIAGNOSIS — M85.80 OTHER SPECIFIED DISORDERS OF BONE DENSITY AND STRUCTURE, UNSPECIFIED SITE: ICD-10-CM

## 2024-10-02 DIAGNOSIS — C91.10 CHRONIC LYMPHOCYTIC LEUKEMIA OF B-CELL TYPE NOT HAVING ACHIEVED REMISSION: ICD-10-CM

## 2024-10-02 DIAGNOSIS — I10 ESSENTIAL (PRIMARY) HYPERTENSION: ICD-10-CM

## 2024-10-02 DIAGNOSIS — K51.90 ULCERATIVE COLITIS, UNSPECIFIED, W/OUT COMPLICATIONS: ICD-10-CM

## 2024-10-02 DIAGNOSIS — Z12.9 ENCOUNTER FOR SCREENING FOR MALIGNANT NEOPLASM, SITE UNSPECIFIED: ICD-10-CM

## 2024-10-02 DIAGNOSIS — E78.5 HYPERLIPIDEMIA, UNSPECIFIED: ICD-10-CM

## 2024-10-02 PROCEDURE — 93000 ELECTROCARDIOGRAM COMPLETE: CPT

## 2024-10-02 PROCEDURE — 99386 PREV VISIT NEW AGE 40-64: CPT

## 2024-10-03 LAB
25(OH)D3 SERPL-MCNC: 31.1 NG/ML
ALBUMIN SERPL ELPH-MCNC: 4.6 G/DL
ALP BLD-CCNC: 77 U/L
ALT SERPL-CCNC: 12 U/L
ANION GAP SERPL CALC-SCNC: 15 MMOL/L
APPEARANCE: CLEAR
AST SERPL-CCNC: 25 U/L
BACTERIA: NEGATIVE /HPF
BILIRUB SERPL-MCNC: 0.3 MG/DL
BILIRUBIN URINE: NEGATIVE
BLOOD URINE: NEGATIVE
BUN SERPL-MCNC: 12 MG/DL
CALCIUM SERPL-MCNC: 9.6 MG/DL
CAST: 0 /LPF
CHLORIDE SERPL-SCNC: 106 MMOL/L
CHOLEST SERPL-MCNC: 232 MG/DL
CK SERPL-CCNC: 47 U/L
CO2 SERPL-SCNC: 24 MMOL/L
COLOR: YELLOW
CREAT SERPL-MCNC: 0.62 MG/DL
CREAT SPEC-SCNC: 207 MG/DL
CRP SERPL HS-MCNC: 0.41 MG/L
EGFR: 101 ML/MIN/1.73M2
EPITHELIAL CELLS: 1 /HPF
ESTIMATED AVERAGE GLUCOSE: 143 MG/DL
FERRITIN SERPL-MCNC: 154 NG/ML
FOLATE SERPL-MCNC: >20 NG/ML
GLUCOSE QUALITATIVE U: NEGATIVE MG/DL
GLUCOSE SERPL-MCNC: 106 MG/DL
HBA1C MFR BLD HPLC: 6.6 %
HDLC SERPL-MCNC: 44 MG/DL
IRON SATN MFR SERPL: 26 %
IRON SERPL-MCNC: 91 UG/DL
KETONES URINE: NEGATIVE MG/DL
LDLC SERPL CALC-MCNC: 170 MG/DL
LEUKOCYTE ESTERASE URINE: ABNORMAL
MICROALBUMIN 24H UR DL<=1MG/L-MCNC: 5 MG/DL
MICROALBUMIN/CREAT 24H UR-RTO: 24 MG/G
MICROSCOPIC-UA: NORMAL
NITRITE URINE: NEGATIVE
NONHDLC SERPL-MCNC: 188 MG/DL
PH URINE: 6
POTASSIUM SERPL-SCNC: 4.8 MMOL/L
PROT SERPL-MCNC: 7.4 G/DL
PROTEIN URINE: 30 MG/DL
RED BLOOD CELLS URINE: 1 /HPF
SODIUM SERPL-SCNC: 145 MMOL/L
SPECIFIC GRAVITY URINE: 1.02
T4 FREE SERPL-MCNC: 1.1 NG/DL
TIBC SERPL-MCNC: 354 UG/DL
TRIGL SERPL-MCNC: 97 MG/DL
TSH SERPL-ACNC: 1.29 UIU/ML
UIBC SERPL-MCNC: 263 UG/DL
URATE SERPL-MCNC: 5.3 MG/DL
UROBILINOGEN URINE: 0.2 MG/DL
VIT B12 SERPL-MCNC: 624 PG/ML
WHITE BLOOD CELLS URINE: 2 /HPF

## 2024-10-05 LAB
BASOPHILS # BLD AUTO: 0.14 K/UL
BASOPHILS NFR BLD AUTO: 0.3 %
EOSINOPHIL # BLD AUTO: 0.58 K/UL
EOSINOPHIL NFR BLD AUTO: 1.2 %
HCT VFR BLD CALC: 41.4 %
HGB BLD-MCNC: 12.9 G/DL
IMM GRANULOCYTES NFR BLD AUTO: 0.2 %
LYMPHOCYTES # BLD AUTO: 41.18 K/UL
LYMPHOCYTES NFR BLD AUTO: 85.8 %
MAN DIFF?: NORMAL
MCHC RBC-ENTMCNC: 29.3 PG
MCHC RBC-ENTMCNC: 31.2 GM/DL
MCV RBC AUTO: 94.1 FL
MONOCYTES # BLD AUTO: 0.6 K/UL
MONOCYTES NFR BLD AUTO: 1.3 %
NEUTROPHILS # BLD AUTO: 5.38 K/UL
NEUTROPHILS NFR BLD AUTO: 11.2 %
PLATELET # BLD AUTO: 145 K/UL
RBC # BLD: 4.4 M/UL
RBC # FLD: 13.2 %
WBC # FLD AUTO: 47.98 K/UL

## 2024-10-06 NOTE — ADDENDUM
[FreeTextEntry1] : This note was written by Ania Barton on 10/02/2024 acting as medical scribe for Dr. Bladimir Ramirez. I, Dr. Bladimir Ramirez, have read and attest that all the information, medical decision making and discharge instructions within are true and accurate.  Event Note

## 2024-10-06 NOTE — HEALTH RISK ASSESSMENT
[0] : 2) Feeling down, depressed, or hopeless: Not at all (0) [PHQ-2 Negative - No further assessment needed] : PHQ-2 Negative - No further assessment needed [Never] : Never [[id number]] : 0

## 2024-10-06 NOTE — HISTORY OF PRESENT ILLNESS
[FreeTextEntry1] : annual physical  [de-identified] : Ms. GORE is a 62 y/o F with PMHx of HTN, ulcerative colitis, SDH wrist pain presenting for annual physical. Pt mentions she has run out of bp medication. Denies chest pain, sob, beltran, dizziness, diaphoresis, palpitations, LE swelling, orthopnea, syncope, n/v, headache.

## 2024-10-06 NOTE — HEALTH RISK ASSESSMENT
[0] : 2) Feeling down, depressed, or hopeless: Not at all (0) [PHQ-2 Negative - No further assessment needed] : PHQ-2 Negative - No further assessment needed [Never] : Never [[id number]] : 0

## 2024-10-06 NOTE — HISTORY OF PRESENT ILLNESS
[FreeTextEntry1] : annual physical  [de-identified] : Ms. GORE is a 64 y/o F with PMHx of HTN, ulcerative colitis, SDH wrist pain presenting for annual physical. Pt mentions she has run out of bp medication. Denies chest pain, sob, belrtan, dizziness, diaphoresis, palpitations, LE swelling, orthopnea, syncope, n/v, headache.

## 2024-11-03 ENCOUNTER — OUTPATIENT (OUTPATIENT)
Dept: OUTPATIENT SERVICES | Facility: HOSPITAL | Age: 64
LOS: 1 days | Discharge: HOME | End: 2024-11-03

## 2024-11-03 DIAGNOSIS — C91.10 CHRONIC LYMPHOCYTIC LEUKEMIA OF B-CELL TYPE NOT HAVING ACHIEVED REMISSION: ICD-10-CM

## 2024-11-04 ENCOUNTER — APPOINTMENT (OUTPATIENT)
Dept: HEMATOLOGY ONCOLOGY | Facility: CLINIC | Age: 64
End: 2024-11-04

## 2024-11-07 ENCOUNTER — RESULT REVIEW (OUTPATIENT)
Age: 64
End: 2024-11-07

## 2024-11-07 ENCOUNTER — APPOINTMENT (OUTPATIENT)
Dept: HEMATOLOGY ONCOLOGY | Facility: CLINIC | Age: 64
End: 2024-11-07

## 2024-11-07 ENCOUNTER — APPOINTMENT (OUTPATIENT)
Dept: HEMATOLOGY ONCOLOGY | Facility: CLINIC | Age: 64
End: 2024-11-07
Payer: COMMERCIAL

## 2024-11-07 VITALS
RESPIRATION RATE: 16 BRPM | OXYGEN SATURATION: 95 % | WEIGHT: 158 LBS | SYSTOLIC BLOOD PRESSURE: 149 MMHG | BODY MASS INDEX: 31.02 KG/M2 | HEART RATE: 78 BPM | TEMPERATURE: 98.1 F | HEIGHT: 60 IN | DIASTOLIC BLOOD PRESSURE: 83 MMHG

## 2024-11-07 DIAGNOSIS — I10 ESSENTIAL (PRIMARY) HYPERTENSION: ICD-10-CM

## 2024-11-07 DIAGNOSIS — C91.10 CHRONIC LYMPHOCYTIC LEUKEMIA OF B-CELL TYPE NOT HAVING ACHIEVED REMISSION: ICD-10-CM

## 2024-11-07 DIAGNOSIS — E78.5 HYPERLIPIDEMIA, UNSPECIFIED: ICD-10-CM

## 2024-11-07 LAB
ALBUMIN SERPL ELPH-MCNC: 4.1 G/DL
ALP BLD-CCNC: 83 U/L
ALT SERPL-CCNC: 10 U/L
ANION GAP SERPL CALC-SCNC: 12 MMOL/L
AST SERPL-CCNC: 22 U/L
BASOPHILS # BLD AUTO: 0 K/UL — SIGNIFICANT CHANGE UP (ref 0–0.2)
BASOPHILS NFR BLD AUTO: 0 % — SIGNIFICANT CHANGE UP (ref 0–2)
BILIRUB SERPL-MCNC: 0.2 MG/DL
BUN SERPL-MCNC: 25 MG/DL
CALCIUM SERPL-MCNC: 9.6 MG/DL
CHLORIDE SERPL-SCNC: 105 MMOL/L
CO2 SERPL-SCNC: 27 MMOL/L
CREAT SERPL-MCNC: 0.82 MG/DL
EGFR: 81 ML/MIN/1.73M2
EOSINOPHIL # BLD AUTO: 0.47 K/UL — SIGNIFICANT CHANGE UP (ref 0–0.5)
EOSINOPHIL NFR BLD AUTO: 1 % — SIGNIFICANT CHANGE UP (ref 0–6)
GLUCOSE SERPL-MCNC: 98 MG/DL
HCT VFR BLD CALC: 37.4 % — SIGNIFICANT CHANGE UP (ref 34.5–45)
HGB BLD-MCNC: 11.8 G/DL — SIGNIFICANT CHANGE UP (ref 11.5–15.5)
LDH SERPL-CCNC: 259 U/L
LYMPHOCYTES # BLD AUTO: 39.74 K/UL — HIGH (ref 1–3.3)
LYMPHOCYTES # BLD AUTO: 85 % — HIGH (ref 13–44)
MCHC RBC-ENTMCNC: 29.9 PG — SIGNIFICANT CHANGE UP (ref 27–34)
MCHC RBC-ENTMCNC: 31.6 G/DL — LOW (ref 32–36)
MCV RBC AUTO: 94.7 FL — SIGNIFICANT CHANGE UP (ref 80–100)
MONOCYTES # BLD AUTO: 0.47 K/UL — SIGNIFICANT CHANGE UP (ref 0–0.9)
MONOCYTES NFR BLD AUTO: 1 % — LOW (ref 2–14)
NEUTROPHILS # BLD AUTO: 6.08 K/UL — SIGNIFICANT CHANGE UP (ref 1.8–7.4)
NEUTROPHILS NFR BLD AUTO: 13 % — LOW (ref 43–77)
NRBC # BLD: 0 /100 WBCS — SIGNIFICANT CHANGE UP (ref 0–0)
NRBC # BLD: SIGNIFICANT CHANGE UP /100 WBCS (ref 0–0)
NRBC BLD-RTO: 0 /100 WBCS — SIGNIFICANT CHANGE UP (ref 0–0)
NRBC BLD-RTO: SIGNIFICANT CHANGE UP /100 WBCS (ref 0–0)
PLAT MORPH BLD: NORMAL — SIGNIFICANT CHANGE UP
PLATELET # BLD AUTO: 144 K/UL — LOW (ref 150–400)
POTASSIUM SERPL-SCNC: 4.4 MMOL/L
PROT SERPL-MCNC: 6.8 G/DL
RBC # BLD: 3.95 M/UL — SIGNIFICANT CHANGE UP (ref 3.8–5.2)
RBC # FLD: 12.9 % — SIGNIFICANT CHANGE UP (ref 10.3–14.5)
RBC BLD AUTO: SIGNIFICANT CHANGE UP
SMUDGE CELLS # BLD: PRESENT — SIGNIFICANT CHANGE UP
SODIUM SERPL-SCNC: 144 MMOL/L
URATE SERPL-MCNC: 6.2 MG/DL
WBC # BLD: 46.75 K/UL — CRITICAL HIGH (ref 3.8–10.5)
WBC # FLD AUTO: 46.75 K/UL — CRITICAL HIGH (ref 3.8–10.5)

## 2024-11-07 PROCEDURE — 99213 OFFICE O/P EST LOW 20 MIN: CPT

## 2024-11-11 LAB
DEPRECATED KAPPA LC FREE/LAMBDA SER: 1.17 RATIO
IGA SER QL IEP: 206 MG/DL
IGG SER QL IEP: 1010 MG/DL
IGM SER QL IEP: 96 MG/DL
KAPPA LC CSF-MCNC: 1.78 MG/DL
KAPPA LC SERPL-MCNC: 2.09 MG/DL

## 2025-02-01 ENCOUNTER — OUTPATIENT (OUTPATIENT)
Dept: OUTPATIENT SERVICES | Facility: HOSPITAL | Age: 65
LOS: 1 days | Discharge: HOME | End: 2025-02-01

## 2025-02-01 DIAGNOSIS — C91.10 CHRONIC LYMPHOCYTIC LEUKEMIA OF B-CELL TYPE NOT HAVING ACHIEVED REMISSION: ICD-10-CM

## 2025-02-03 ENCOUNTER — APPOINTMENT (OUTPATIENT)
Dept: HEMATOLOGY ONCOLOGY | Facility: CLINIC | Age: 65
End: 2025-02-03
Payer: COMMERCIAL

## 2025-02-03 ENCOUNTER — RESULT REVIEW (OUTPATIENT)
Age: 65
End: 2025-02-03

## 2025-02-03 ENCOUNTER — NON-APPOINTMENT (OUTPATIENT)
Age: 65
End: 2025-02-03

## 2025-02-03 VITALS
HEART RATE: 75 BPM | BODY MASS INDEX: 29.75 KG/M2 | SYSTOLIC BLOOD PRESSURE: 136 MMHG | WEIGHT: 152.34 LBS | RESPIRATION RATE: 16 BRPM | TEMPERATURE: 97.9 F | DIASTOLIC BLOOD PRESSURE: 85 MMHG | OXYGEN SATURATION: 98 %

## 2025-02-03 DIAGNOSIS — K51.90 ULCERATIVE COLITIS, UNSPECIFIED, W/OUT COMPLICATIONS: ICD-10-CM

## 2025-02-03 DIAGNOSIS — C91.10 CHRONIC LYMPHOCYTIC LEUKEMIA OF B-CELL TYPE NOT HAVING ACHIEVED REMISSION: ICD-10-CM

## 2025-02-03 DIAGNOSIS — D69.6 THROMBOCYTOPENIA, UNSPECIFIED: ICD-10-CM

## 2025-02-03 DIAGNOSIS — E11.9 TYPE 2 DIABETES MELLITUS W/OUT COMPLICATIONS: ICD-10-CM

## 2025-02-03 LAB
BASOPHILS # BLD AUTO: 0 K/UL — SIGNIFICANT CHANGE UP (ref 0–0.2)
BASOPHILS NFR BLD AUTO: 0 % — SIGNIFICANT CHANGE UP (ref 0–2)
EOSINOPHIL # BLD AUTO: 1.37 K/UL — HIGH (ref 0–0.5)
EOSINOPHIL NFR BLD AUTO: 3 % — SIGNIFICANT CHANGE UP (ref 0–6)
HCT VFR BLD CALC: 40.3 % — SIGNIFICANT CHANGE UP (ref 34.5–45)
HGB BLD-MCNC: 12.7 G/DL — SIGNIFICANT CHANGE UP (ref 11.5–15.5)
LYMPHOCYTES # BLD AUTO: 37.42 K/UL — HIGH (ref 1–3.3)
LYMPHOCYTES # BLD AUTO: 82 % — HIGH (ref 13–44)
MCHC RBC-ENTMCNC: 30.2 PG — SIGNIFICANT CHANGE UP (ref 27–34)
MCHC RBC-ENTMCNC: 31.5 G/DL — LOW (ref 32–36)
MCV RBC AUTO: 95.7 FL — SIGNIFICANT CHANGE UP (ref 80–100)
MONOCYTES # BLD AUTO: 1.37 K/UL — HIGH (ref 0–0.9)
MONOCYTES NFR BLD AUTO: 3 % — SIGNIFICANT CHANGE UP (ref 2–14)
NEUTROPHILS # BLD AUTO: 5.48 K/UL — SIGNIFICANT CHANGE UP (ref 1.8–7.4)
NEUTROPHILS NFR BLD AUTO: 12 % — LOW (ref 43–77)
NRBC # BLD: 0 /100 WBCS — SIGNIFICANT CHANGE UP (ref 0–0)
NRBC # BLD: SIGNIFICANT CHANGE UP /100 WBCS (ref 0–0)
NRBC BLD-RTO: 0 /100 WBCS — SIGNIFICANT CHANGE UP (ref 0–0)
NRBC BLD-RTO: SIGNIFICANT CHANGE UP /100 WBCS (ref 0–0)
PLAT MORPH BLD: NORMAL — SIGNIFICANT CHANGE UP
PLATELET # BLD AUTO: 142 K/UL — LOW (ref 150–400)
RBC # BLD: 4.21 M/UL — SIGNIFICANT CHANGE UP (ref 3.8–5.2)
RBC # FLD: 13.2 % — SIGNIFICANT CHANGE UP (ref 10.3–14.5)
RBC BLD AUTO: SIGNIFICANT CHANGE UP
SMUDGE CELLS # BLD: PRESENT — SIGNIFICANT CHANGE UP
WBC # BLD: 45.64 K/UL — CRITICAL HIGH (ref 3.8–10.5)
WBC # FLD AUTO: 45.64 K/UL — CRITICAL HIGH (ref 3.8–10.5)

## 2025-02-03 PROCEDURE — G2211 COMPLEX E/M VISIT ADD ON: CPT

## 2025-02-03 PROCEDURE — 99213 OFFICE O/P EST LOW 20 MIN: CPT

## 2025-02-04 PROBLEM — D69.6 THROMBOCYTOPENIA, ACQUIRED: Status: ACTIVE | Noted: 2025-02-04

## 2025-02-04 LAB
ALBUMIN SERPL ELPH-MCNC: 4.2 G/DL
ALP BLD-CCNC: 79 U/L
ALT SERPL-CCNC: 7 U/L
ANION GAP SERPL CALC-SCNC: 10 MMOL/L
AST SERPL-CCNC: 18 U/L
BILIRUB SERPL-MCNC: 0.3 MG/DL
BUN SERPL-MCNC: 18 MG/DL
CALCIUM SERPL-MCNC: 9.3 MG/DL
CHLORIDE SERPL-SCNC: 106 MMOL/L
CO2 SERPL-SCNC: 29 MMOL/L
CREAT SERPL-MCNC: 0.72 MG/DL
DEPRECATED KAPPA LC FREE/LAMBDA SER: 1.09 RATIO
EGFR: 94 ML/MIN/1.73M2
GLUCOSE SERPL-MCNC: 111 MG/DL
IGA SER QL IEP: 199 MG/DL
IGG SER QL IEP: 1027 MG/DL
IGM SER QL IEP: 122 MG/DL
KAPPA LC CSF-MCNC: 2.22 MG/DL
KAPPA LC SERPL-MCNC: 2.41 MG/DL
LDH SERPL-CCNC: 222 U/L
POTASSIUM SERPL-SCNC: 4.5 MMOL/L
PROT SERPL-MCNC: 6.9 G/DL
SODIUM SERPL-SCNC: 145 MMOL/L
URATE SERPL-MCNC: 5.9 MG/DL

## 2025-04-23 ENCOUNTER — NON-APPOINTMENT (OUTPATIENT)
Age: 65
End: 2025-04-23

## 2025-04-24 ENCOUNTER — APPOINTMENT (OUTPATIENT)
Dept: INTERNAL MEDICINE | Facility: CLINIC | Age: 65
End: 2025-04-24

## 2025-05-05 ENCOUNTER — LABORATORY RESULT (OUTPATIENT)
Age: 65
End: 2025-05-05

## 2025-05-05 ENCOUNTER — APPOINTMENT (OUTPATIENT)
Dept: INTERNAL MEDICINE | Facility: CLINIC | Age: 65
End: 2025-05-05
Payer: COMMERCIAL

## 2025-05-05 VITALS
SYSTOLIC BLOOD PRESSURE: 130 MMHG | HEART RATE: 74 BPM | HEIGHT: 60 IN | OXYGEN SATURATION: 98 % | DIASTOLIC BLOOD PRESSURE: 80 MMHG | BODY MASS INDEX: 29.25 KG/M2 | WEIGHT: 149 LBS | TEMPERATURE: 98.5 F

## 2025-05-05 DIAGNOSIS — K51.90 ULCERATIVE COLITIS, UNSPECIFIED, W/OUT COMPLICATIONS: ICD-10-CM

## 2025-05-05 DIAGNOSIS — H02.9 UNSPECIFIED DISORDER OF EYELID: ICD-10-CM

## 2025-05-05 DIAGNOSIS — E78.5 HYPERLIPIDEMIA, UNSPECIFIED: ICD-10-CM

## 2025-05-05 DIAGNOSIS — R92.8 OTHER ABNORMAL AND INCONCLUSIVE FINDINGS ON DIAGNOSTIC IMAGING OF BREAST: ICD-10-CM

## 2025-05-05 DIAGNOSIS — R63.4 ABNORMAL WEIGHT LOSS: ICD-10-CM

## 2025-05-05 DIAGNOSIS — N39.0 URINARY TRACT INFECTION, SITE NOT SPECIFIED: ICD-10-CM

## 2025-05-05 DIAGNOSIS — D69.6 THROMBOCYTOPENIA, UNSPECIFIED: ICD-10-CM

## 2025-05-05 DIAGNOSIS — C91.10 CHRONIC LYMPHOCYTIC LEUKEMIA OF B-CELL TYPE NOT HAVING ACHIEVED REMISSION: ICD-10-CM

## 2025-05-05 DIAGNOSIS — Z12.9 ENCOUNTER FOR SCREENING FOR MALIGNANT NEOPLASM, SITE UNSPECIFIED: ICD-10-CM

## 2025-05-05 DIAGNOSIS — I10 ESSENTIAL (PRIMARY) HYPERTENSION: ICD-10-CM

## 2025-05-05 DIAGNOSIS — R10.2 PELVIC AND PERINEAL PAIN: ICD-10-CM

## 2025-05-05 DIAGNOSIS — R30.0 DYSURIA: ICD-10-CM

## 2025-05-05 DIAGNOSIS — Z00.00 ENCOUNTER FOR GENERAL ADULT MEDICAL EXAMINATION W/OUT ABNORMAL FINDINGS: ICD-10-CM

## 2025-05-05 PROCEDURE — 99214 OFFICE O/P EST MOD 30 MIN: CPT

## 2025-05-05 PROCEDURE — G2211 COMPLEX E/M VISIT ADD ON: CPT

## 2025-05-05 RX ORDER — CEFPODOXIME PROXETIL 100 MG/1
100 TABLET, FILM COATED ORAL TWICE DAILY
Qty: 14 | Refills: 0 | Status: ACTIVE | COMMUNITY
Start: 2025-05-05 | End: 1900-01-01

## 2025-05-06 DIAGNOSIS — R79.89 OTHER SPECIFIED ABNORMAL FINDINGS OF BLOOD CHEMISTRY: ICD-10-CM

## 2025-05-06 PROBLEM — H02.9 EYELID ABNORMALITY: Status: ACTIVE | Noted: 2025-05-05

## 2025-05-06 PROBLEM — R30.0 DYSURIA: Status: ACTIVE | Noted: 2025-05-05

## 2025-05-06 PROBLEM — N39.0 UTI (URINARY TRACT INFECTION): Status: ACTIVE | Noted: 2025-05-05 | Resolved: 2025-06-04

## 2025-05-06 PROBLEM — R63.4 WEIGHT LOSS: Status: ACTIVE | Noted: 2025-05-05

## 2025-05-06 PROBLEM — R10.2 SUPRAPUBIC DISCOMFORT: Status: ACTIVE | Noted: 2025-05-05

## 2025-05-06 LAB
ALBUMIN SERPL ELPH-MCNC: 4.3 G/DL
ALP BLD-CCNC: 81 U/L
ALT SERPL-CCNC: 8 U/L
ANION GAP SERPL CALC-SCNC: 14 MMOL/L
APPEARANCE: CLEAR
AST SERPL-CCNC: 20 U/L
BACTERIA: NEGATIVE /HPF
BILIRUB SERPL-MCNC: 0.2 MG/DL
BILIRUBIN URINE: NEGATIVE
BLOOD URINE: ABNORMAL
BUN SERPL-MCNC: 19 MG/DL
CALCIUM SERPL-MCNC: 9.4 MG/DL
CAST: 0 /LPF
CHLORIDE SERPL-SCNC: 107 MMOL/L
CHOLEST SERPL-MCNC: 235 MG/DL
CK SERPL-CCNC: 44 U/L
CO2 SERPL-SCNC: 23 MMOL/L
COLOR: YELLOW
CREAT SERPL-MCNC: 0.64 MG/DL
EGFRCR SERPLBLD CKD-EPI 2021: 99 ML/MIN/1.73M2
EPITHELIAL CELLS: 0 /HPF
ESTIMATED AVERAGE GLUCOSE: 126 MG/DL
GLUCOSE QUALITATIVE U: NEGATIVE MG/DL
GLUCOSE SERPL-MCNC: 93 MG/DL
HBA1C MFR BLD HPLC: 6 %
HDLC SERPL-MCNC: 44 MG/DL
KETONES URINE: ABNORMAL MG/DL
LDLC SERPL-MCNC: 170 MG/DL
LEUKOCYTE ESTERASE URINE: ABNORMAL
MICROSCOPIC-UA: NORMAL
NITRITE URINE: NEGATIVE
NONHDLC SERPL-MCNC: 191 MG/DL
PH URINE: 5.5
POTASSIUM SERPL-SCNC: 5 MMOL/L
PROT SERPL-MCNC: 7.1 G/DL
PROTEIN URINE: 30 MG/DL
RED BLOOD CELLS URINE: 1 /HPF
REVIEW: NORMAL
SODIUM SERPL-SCNC: 145 MMOL/L
SPECIFIC GRAVITY URINE: 1.03
T4 FREE SERPL-MCNC: 1 NG/DL
TRIGL SERPL-MCNC: 121 MG/DL
TSH SERPL-ACNC: 1.17 UIU/ML
UROBILINOGEN URINE: 0.2 MG/DL
WHITE BLOOD CELLS URINE: 58 /HPF

## 2025-05-07 LAB
BACTERIA UR CULT: ABNORMAL
BASOPHILS # BLD AUTO: 0.51 K/UL
BASOPHILS NFR BLD AUTO: 1 %
EOSINOPHIL # BLD AUTO: 0.51 K/UL
EOSINOPHIL NFR BLD AUTO: 1 %
HCT VFR BLD CALC: 40.2 %
HGB BLD-MCNC: 12.3 G/DL
LYMPHOCYTES # BLD AUTO: 43.46 K/UL
LYMPHOCYTES NFR BLD AUTO: 86 %
MAN DIFF?: NORMAL
MCHC RBC-ENTMCNC: 30.4 PG
MCHC RBC-ENTMCNC: 30.6 G/DL
MCV RBC AUTO: 99.5 FL
MONOCYTES # BLD AUTO: 1.01 K/UL
MONOCYTES NFR BLD AUTO: 2 %
NEUTROPHILS # BLD AUTO: 5.05 K/UL
NEUTROPHILS NFR BLD AUTO: 10 %
PLATELET # BLD AUTO: 153 K/UL
RBC # BLD: 4.04 M/UL
RBC # FLD: 13 %
WBC # FLD AUTO: 50.54 K/UL

## 2025-05-27 ENCOUNTER — OUTPATIENT (OUTPATIENT)
Dept: OUTPATIENT SERVICES | Facility: HOSPITAL | Age: 65
LOS: 1 days | Discharge: HOME | End: 2025-05-27

## 2025-05-27 DIAGNOSIS — C91.10 CHRONIC LYMPHOCYTIC LEUKEMIA OF B-CELL TYPE NOT HAVING ACHIEVED REMISSION: ICD-10-CM

## 2025-06-02 ENCOUNTER — APPOINTMENT (OUTPATIENT)
Dept: OBGYN | Facility: CLINIC | Age: 65
End: 2025-06-02
Payer: COMMERCIAL

## 2025-06-02 ENCOUNTER — RESULT REVIEW (OUTPATIENT)
Age: 65
End: 2025-06-02

## 2025-06-02 ENCOUNTER — APPOINTMENT (OUTPATIENT)
Dept: HEMATOLOGY ONCOLOGY | Facility: CLINIC | Age: 65
End: 2025-06-02
Payer: COMMERCIAL

## 2025-06-02 VITALS
BODY MASS INDEX: 29.62 KG/M2 | DIASTOLIC BLOOD PRESSURE: 81 MMHG | RESPIRATION RATE: 16 BRPM | WEIGHT: 151.68 LBS | OXYGEN SATURATION: 98 % | TEMPERATURE: 97.2 F | HEART RATE: 76 BPM | SYSTOLIC BLOOD PRESSURE: 125 MMHG

## 2025-06-02 VITALS
SYSTOLIC BLOOD PRESSURE: 146 MMHG | HEIGHT: 60 IN | WEIGHT: 150.38 LBS | DIASTOLIC BLOOD PRESSURE: 76 MMHG | BODY MASS INDEX: 29.52 KG/M2

## 2025-06-02 DIAGNOSIS — R30.0 DYSURIA: ICD-10-CM

## 2025-06-02 DIAGNOSIS — Z11.51 ENCOUNTER FOR SCREENING FOR HUMAN PAPILLOMAVIRUS (HPV): ICD-10-CM

## 2025-06-02 DIAGNOSIS — Z87.898 PERSONAL HISTORY OF OTHER SPECIFIED CONDITIONS: ICD-10-CM

## 2025-06-02 DIAGNOSIS — Z87.19 PERSONAL HISTORY OF OTHER DISEASES OF THE DIGESTIVE SYSTEM: ICD-10-CM

## 2025-06-02 DIAGNOSIS — Z12.4 ENCOUNTER FOR SCREENING FOR MALIGNANT NEOPLASM OF CERVIX: ICD-10-CM

## 2025-06-02 DIAGNOSIS — D69.6 THROMBOCYTOPENIA, UNSPECIFIED: ICD-10-CM

## 2025-06-02 DIAGNOSIS — Z13.820 ENCOUNTER FOR SCREENING FOR OSTEOPOROSIS: ICD-10-CM

## 2025-06-02 DIAGNOSIS — N39.0 URINARY TRACT INFECTION, SITE NOT SPECIFIED: ICD-10-CM

## 2025-06-02 DIAGNOSIS — C91.10 CHRONIC LYMPHOCYTIC LEUKEMIA OF B-CELL TYPE NOT HAVING ACHIEVED REMISSION: ICD-10-CM

## 2025-06-02 LAB
BASOPHILS # BLD AUTO: 0 K/UL — SIGNIFICANT CHANGE UP (ref 0–0.2)
BASOPHILS NFR BLD AUTO: 0 % — SIGNIFICANT CHANGE UP (ref 0–2)
EOSINOPHIL # BLD AUTO: 0.5 K/UL — SIGNIFICANT CHANGE UP (ref 0–0.5)
EOSINOPHIL NFR BLD AUTO: 1 % — SIGNIFICANT CHANGE UP (ref 0–6)
HCT VFR BLD CALC: 37.2 % — SIGNIFICANT CHANGE UP (ref 34.5–45)
HGB BLD-MCNC: 11.8 G/DL — SIGNIFICANT CHANGE UP (ref 11.5–15.5)
LYMPHOCYTES # BLD AUTO: 41.32 K/UL — HIGH (ref 1–3.3)
LYMPHOCYTES # BLD AUTO: 82.5 % — HIGH (ref 13–44)
LYMPHOCYTES # SPEC AUTO: 2.5 % — HIGH (ref 0–0)
MCHC RBC-ENTMCNC: 30.6 PG — SIGNIFICANT CHANGE UP (ref 27–34)
MCHC RBC-ENTMCNC: 31.7 G/DL — LOW (ref 32–36)
MCV RBC AUTO: 96.4 FL — SIGNIFICANT CHANGE UP (ref 80–100)
MONOCYTES # BLD AUTO: 1.75 K/UL — HIGH (ref 0–0.9)
MONOCYTES NFR BLD AUTO: 3.5 % — SIGNIFICANT CHANGE UP (ref 2–14)
NEUTROPHILS # BLD AUTO: 5.26 K/UL — SIGNIFICANT CHANGE UP (ref 1.8–7.4)
NEUTROPHILS NFR BLD AUTO: 10.5 % — LOW (ref 43–77)
NRBC # BLD: 0 /100 WBCS — SIGNIFICANT CHANGE UP (ref 0–0)
NRBC BLD AUTO-RTO: SIGNIFICANT CHANGE UP /100 WBCS (ref 0–0)
NRBC BLD-RTO: 0 /100 WBCS — SIGNIFICANT CHANGE UP (ref 0–0)
PLAT MORPH BLD: NORMAL — SIGNIFICANT CHANGE UP
PLATELET # BLD AUTO: 137 K/UL — LOW (ref 150–400)
RBC # BLD: 3.86 M/UL — SIGNIFICANT CHANGE UP (ref 3.8–5.2)
RBC # FLD: 12.8 % — SIGNIFICANT CHANGE UP (ref 10.3–14.5)
RBC BLD AUTO: SIGNIFICANT CHANGE UP
SMUDGE CELLS # BLD: PRESENT — SIGNIFICANT CHANGE UP
WBC # BLD: 50.09 K/UL — CRITICAL HIGH (ref 3.8–10.5)
WBC # FLD AUTO: 50.09 K/UL — CRITICAL HIGH (ref 3.8–10.5)

## 2025-06-02 PROCEDURE — G0444 DEPRESSION SCREEN ANNUAL: CPT | Mod: 59

## 2025-06-02 PROCEDURE — 99213 OFFICE O/P EST LOW 20 MIN: CPT

## 2025-06-02 PROCEDURE — G2211 COMPLEX E/M VISIT ADD ON: CPT

## 2025-06-02 PROCEDURE — 99386 PREV VISIT NEW AGE 40-64: CPT

## 2025-06-03 PROBLEM — N39.0 UTI (URINARY TRACT INFECTION): Status: RESOLVED | Noted: 2025-05-05 | Resolved: 2025-06-04

## 2025-06-03 PROBLEM — Z87.19 HISTORY OF ULCERATIVE COLITIS: Status: RESOLVED | Noted: 2021-12-03 | Resolved: 2025-06-03

## 2025-06-03 LAB
ALBUMIN SERPL ELPH-MCNC: 4.2 G/DL
ALP BLD-CCNC: 79 U/L
ALT SERPL-CCNC: 6 U/L
ANION GAP SERPL CALC-SCNC: 10 MMOL/L
APPEARANCE: CLEAR
AST SERPL-CCNC: 17 U/L
BACTERIA: NEGATIVE /HPF
BILIRUB SERPL-MCNC: 0.2 MG/DL
BILIRUBIN URINE: NEGATIVE
BLOOD URINE: NEGATIVE
BUN SERPL-MCNC: 20 MG/DL
CALCIUM SERPL-MCNC: 9.4 MG/DL
CAST: 0 /LPF
CHLORIDE SERPL-SCNC: 107 MMOL/L
CO2 SERPL-SCNC: 27 MMOL/L
COLOR: YELLOW
CREAT SERPL-MCNC: 0.66 MG/DL
EGFRCR SERPLBLD CKD-EPI 2021: 99 ML/MIN/1.73M2
EPITHELIAL CELLS: 1 /HPF
GLUCOSE QUALITATIVE U: NEGATIVE MG/DL
GLUCOSE SERPL-MCNC: 125 MG/DL
KETONES URINE: ABNORMAL MG/DL
LDH SERPL-CCNC: 215 U/L
LEUKOCYTE ESTERASE URINE: ABNORMAL
MICROSCOPIC-UA: NORMAL
NITRITE URINE: NEGATIVE
PH URINE: 5.5
POTASSIUM SERPL-SCNC: 4.6 MMOL/L
PROT SERPL-MCNC: 6.6 G/DL
PROTEIN URINE: NORMAL MG/DL
RED BLOOD CELLS URINE: 0 /HPF
SODIUM SERPL-SCNC: 144 MMOL/L
SPECIFIC GRAVITY URINE: >1.03
URATE SERPL-MCNC: 4.9 MG/DL
UROBILINOGEN URINE: 0.2 MG/DL
WHITE BLOOD CELLS URINE: 1 /HPF

## 2025-06-03 RX ORDER — CEFUROXIME AXETIL 250 MG/1
250 TABLET, FILM COATED ORAL
Qty: 10 | Refills: 0 | Status: COMPLETED | COMMUNITY
Start: 2025-04-23

## 2025-06-04 LAB
BACTERIA UR CULT: NORMAL
HPV HIGH+LOW RISK DNA PNL CVX: NOT DETECTED

## 2025-06-05 LAB — CYTOLOGY CVX/VAG DOC THIN PREP: ABNORMAL

## 2025-06-06 LAB
DEPRECATED KAPPA LC FREE/LAMBDA SER: 0.98 RATIO
IGA SERPL-MCNC: 181 MG/DL
IGG SERPL-MCNC: 1016 MG/DL
IGM SERPL-MCNC: 107 MG/DL
KAPPA LC CSF-MCNC: 2 MG/DL
KAPPA LC SERPL-MCNC: 1.97 MG/DL

## 2025-06-19 ENCOUNTER — RESULT REVIEW (OUTPATIENT)
Age: 65
End: 2025-06-19

## 2025-06-19 ENCOUNTER — OUTPATIENT (OUTPATIENT)
Dept: OUTPATIENT SERVICES | Facility: HOSPITAL | Age: 65
LOS: 1 days | End: 2025-06-19
Payer: COMMERCIAL

## 2025-06-19 ENCOUNTER — APPOINTMENT (OUTPATIENT)
Dept: ULTRASOUND IMAGING | Facility: IMAGING CENTER | Age: 65
End: 2025-06-19
Payer: COMMERCIAL

## 2025-06-19 ENCOUNTER — APPOINTMENT (OUTPATIENT)
Dept: MAMMOGRAPHY | Facility: IMAGING CENTER | Age: 65
End: 2025-06-19
Payer: COMMERCIAL

## 2025-06-19 DIAGNOSIS — Z00.8 ENCOUNTER FOR OTHER GENERAL EXAMINATION: ICD-10-CM

## 2025-06-19 PROCEDURE — 77063 BREAST TOMOSYNTHESIS BI: CPT

## 2025-06-19 PROCEDURE — 77063 BREAST TOMOSYNTHESIS BI: CPT | Mod: 26

## 2025-06-19 PROCEDURE — 77067 SCR MAMMO BI INCL CAD: CPT

## 2025-06-19 PROCEDURE — 76641 ULTRASOUND BREAST COMPLETE: CPT | Mod: 26,50

## 2025-06-19 PROCEDURE — 77066 DX MAMMO INCL CAD BI: CPT

## 2025-06-19 PROCEDURE — 77067 SCR MAMMO BI INCL CAD: CPT | Mod: 26

## 2025-06-19 PROCEDURE — G0279: CPT

## 2025-06-19 PROCEDURE — 76641 ULTRASOUND BREAST COMPLETE: CPT

## 2025-07-08 ENCOUNTER — APPOINTMENT (OUTPATIENT)
Dept: INTERNAL MEDICINE | Facility: CLINIC | Age: 65
End: 2025-07-08
Payer: COMMERCIAL

## 2025-07-08 VITALS
OXYGEN SATURATION: 97 % | HEIGHT: 60 IN | DIASTOLIC BLOOD PRESSURE: 78 MMHG | TEMPERATURE: 98 F | SYSTOLIC BLOOD PRESSURE: 110 MMHG | WEIGHT: 148 LBS | BODY MASS INDEX: 29.06 KG/M2 | HEART RATE: 82 BPM

## 2025-07-08 PROBLEM — I83.90 VARICOSE VEINS: Status: ACTIVE | Noted: 2025-07-08

## 2025-07-08 PROCEDURE — G2211 COMPLEX E/M VISIT ADD ON: CPT

## 2025-07-08 PROCEDURE — 99214 OFFICE O/P EST MOD 30 MIN: CPT

## 2025-07-08 RX ORDER — METHOCARBAMOL 500 MG/1
500 TABLET, FILM COATED ORAL
Qty: 15 | Refills: 0 | Status: ACTIVE | COMMUNITY
Start: 2025-07-08 | End: 1900-01-01

## 2025-07-13 PROBLEM — M54.16 LUMBAR RADICULOPATHY: Status: ACTIVE | Noted: 2025-07-08

## 2025-08-18 ENCOUNTER — APPOINTMENT (OUTPATIENT)
Dept: OBGYN | Facility: CLINIC | Age: 65
End: 2025-08-18

## 2025-09-12 ENCOUNTER — APPOINTMENT (OUTPATIENT)
Dept: OPHTHALMOLOGY | Facility: CLINIC | Age: 65
End: 2025-09-12